# Patient Record
Sex: MALE | Race: WHITE | NOT HISPANIC OR LATINO | Employment: OTHER | URBAN - METROPOLITAN AREA
[De-identification: names, ages, dates, MRNs, and addresses within clinical notes are randomized per-mention and may not be internally consistent; named-entity substitution may affect disease eponyms.]

---

## 2018-08-06 ENCOUNTER — CONVERSION ENCOUNTER (OUTPATIENT)
Dept: SURGERY | Facility: CLINIC | Age: 58
End: 2018-08-06

## 2018-08-06 ENCOUNTER — OFFICE VISIT CONVERTED (OUTPATIENT)
Dept: SURGERY | Facility: CLINIC | Age: 58
End: 2018-08-06
Attending: SURGERY

## 2018-09-11 ENCOUNTER — OFFICE VISIT CONVERTED (OUTPATIENT)
Dept: SURGERY | Facility: CLINIC | Age: 58
End: 2018-09-11
Attending: SURGERY

## 2018-09-11 ENCOUNTER — CONVERSION ENCOUNTER (OUTPATIENT)
Dept: SURGERY | Facility: CLINIC | Age: 58
End: 2018-09-11

## 2019-01-25 ENCOUNTER — HOSPITAL ENCOUNTER (OUTPATIENT)
Dept: LAB | Facility: HOSPITAL | Age: 59
Discharge: HOME | End: 2019-01-25
Attending: INTERNAL MEDICINE

## 2019-01-25 LAB
ANION GAP SERPL CALC-SCNC: 22 MMOL/L (ref 8–19)
APPEARANCE UR: CLEAR
BASOPHILS # BLD AUTO: 0.11 10*3/UL (ref 0–0.2)
BASOPHILS NFR BLD AUTO: 1.15 % (ref 0–3)
BILIRUB UR QL: NEGATIVE
BUN SERPL-MCNC: 44 MG/DL (ref 5–25)
BUN/CREAT SERPL: 11 {RATIO} (ref 6–20)
CALCIUM SERPL-MCNC: 8.8 MG/DL (ref 8.7–10.4)
CHLORIDE SERPL-SCNC: 95 MMOL/L (ref 99–111)
COLOR UR: YELLOW
CONV CO2: 21 MMOL/L (ref 22–32)
CONV COLLECTION SOURCE (UA): ABNORMAL
CONV CREATININE URINE, RANDOM: 132.1 MG/DL (ref 10–300)
CONV UROBILINOGEN IN URINE BY AUTOMATED TEST STRIP: 0.2 {EHRLICHU}/DL (ref 0.1–1)
CREAT UR-MCNC: 4 MG/DL (ref 0.7–1.2)
EOSINOPHIL # BLD AUTO: 0.19 10*3/UL (ref 0–0.7)
EOSINOPHIL # BLD AUTO: 1.94 % (ref 0–7)
ERYTHROCYTE [DISTWIDTH] IN BLOOD BY AUTOMATED COUNT: 14.7 % (ref 11.5–14.5)
GFR SERPLBLD BASED ON 1.73 SQ M-ARVRAT: 16 ML/MIN/{1.73_M2}
GLUCOSE SERPL-MCNC: 309 MG/DL (ref 70–99)
GLUCOSE UR QL: >=1000 MG/DL
HBA1C MFR BLD: 12.2 G/DL (ref 14–18)
HCT VFR BLD AUTO: 36 % (ref 42–52)
HGB UR QL STRIP: NEGATIVE
KETONES UR QL STRIP: NEGATIVE MG/DL
LEUKOCYTE ESTERASE UR QL STRIP: NEGATIVE
LYMPHOCYTES # BLD AUTO: 2.07 10*3/UL (ref 1–5)
MCH RBC QN AUTO: 27.9 PG (ref 27–31)
MCHC RBC AUTO-ENTMCNC: 33.8 G/DL (ref 33–37)
MCV RBC AUTO: 82.7 FL (ref 80–96)
MONOCYTES # BLD AUTO: 0.53 10*3/UL (ref 0.2–1.2)
MONOCYTES NFR BLD AUTO: 5.36 % (ref 3–10)
NEUTROPHILS # BLD AUTO: 6.96 10*3/UL (ref 2–8)
NEUTROPHILS NFR BLD AUTO: 70.6 % (ref 30–85)
NITRITE UR QL STRIP: NEGATIVE
NRBC BLD AUTO-RTO: 0 % (ref 0–0.01)
OSMOLALITY SERPL CALC.SUM OF ELEC: 299 MOSM/KG (ref 273–304)
PH UR STRIP.AUTO: 6 [PH] (ref 5–8)
PHOSPHATE SERPL-MCNC: 5 MG/DL (ref 2.4–4.5)
PLATELET # BLD AUTO: 305 10*3/UL (ref 130–400)
PMV BLD AUTO: 8.7 FL (ref 7.4–10.4)
POTASSIUM SERPL-SCNC: 4.8 MMOL/L (ref 3.5–5.3)
PROT UR QL: >300 MG/DL
PROT UR-MCNC: 831.1 MG/DL
PTH-INTACT SERPL-MCNC: 108.6 PG/ML (ref 11.1–79.5)
RBC # BLD AUTO: 4.35 10*6/UL (ref 4.7–6.1)
SODIUM SERPL-SCNC: 133 MMOL/L (ref 135–147)
SP GR UR: 1.02 (ref 1–1.03)
URATE SERPL-MCNC: 8.5 MG/DL (ref 3.5–8.5)
VARIANT LYMPHS NFR BLD MANUAL: 21 % (ref 20–45)
WBC # BLD AUTO: 9.86 10*3/UL (ref 4.8–10.8)

## 2019-01-26 LAB — 25(OH)D3 SERPL-MCNC: 14 NG/ML (ref 30–100)

## 2019-01-30 ENCOUNTER — APPOINTMENT (OUTPATIENT)
Dept: PREADMISSION TESTING | Facility: HOSPITAL | Age: 59
End: 2019-01-30

## 2019-01-30 ENCOUNTER — HOSPITAL ENCOUNTER (OUTPATIENT)
Dept: GENERAL RADIOLOGY | Facility: HOSPITAL | Age: 59
Discharge: HOME | End: 2019-01-30
Admitting: SURGERY

## 2019-01-30 VITALS
WEIGHT: 315 LBS | OXYGEN SATURATION: 97 % | SYSTOLIC BLOOD PRESSURE: 100 MMHG | BODY MASS INDEX: 42.66 KG/M2 | HEART RATE: 71 BPM | DIASTOLIC BLOOD PRESSURE: 65 MMHG | TEMPERATURE: 97.7 F | HEIGHT: 72 IN | RESPIRATION RATE: 18 BRPM

## 2019-01-30 LAB
ALBUMIN SERPL-MCNC: 3.5 G/DL (ref 3.5–5.2)
ALBUMIN/GLOB SERPL: 0.9 G/DL
ALP SERPL-CCNC: 96 U/L (ref 39–117)
ALT SERPL W P-5'-P-CCNC: 15 U/L (ref 1–41)
ANION GAP SERPL CALCULATED.3IONS-SCNC: 17.7 MMOL/L
APTT PPP: 27.4 SECONDS (ref 22.7–35.4)
AST SERPL-CCNC: 9 U/L (ref 1–40)
BACTERIA UR QL AUTO: NORMAL /HPF
BILIRUB SERPL-MCNC: 0.2 MG/DL (ref 0.1–1.2)
BILIRUB UR QL STRIP: NEGATIVE
BUN BLD-MCNC: 41 MG/DL (ref 6–20)
BUN/CREAT SERPL: 11.1 (ref 7–25)
CALCIUM SPEC-SCNC: 9.1 MG/DL (ref 8.6–10.5)
CHLORIDE SERPL-SCNC: 96 MMOL/L (ref 98–107)
CLARITY UR: CLEAR
CO2 SERPL-SCNC: 21.3 MMOL/L (ref 22–29)
COLOR UR: YELLOW
CREAT BLD-MCNC: 3.69 MG/DL (ref 0.76–1.27)
DEPRECATED RDW RBC AUTO: 48.5 FL (ref 37–54)
ERYTHROCYTE [DISTWIDTH] IN BLOOD BY AUTOMATED COUNT: 15.3 % (ref 11.5–14.5)
GFR SERPL CREATININE-BSD FRML MDRD: 17 ML/MIN/1.73
GLOBULIN UR ELPH-MCNC: 3.8 GM/DL
GLUCOSE BLD-MCNC: 299 MG/DL (ref 65–99)
GLUCOSE UR STRIP-MCNC: ABNORMAL MG/DL
HCT VFR BLD AUTO: 40.6 % (ref 40.4–52.2)
HGB BLD-MCNC: 12.7 G/DL (ref 13.7–17.6)
HGB UR QL STRIP.AUTO: NEGATIVE
HYALINE CASTS UR QL AUTO: NORMAL /LPF
INR PPP: 0.98 (ref 0.9–1.1)
KETONES UR QL STRIP: NEGATIVE
LEUKOCYTE ESTERASE UR QL STRIP.AUTO: NEGATIVE
MCH RBC QN AUTO: 27 PG (ref 27–32.7)
MCHC RBC AUTO-ENTMCNC: 31.3 G/DL (ref 32.6–36.4)
MCV RBC AUTO: 86.2 FL (ref 79.8–96.2)
NITRITE UR QL STRIP: NEGATIVE
PH UR STRIP.AUTO: 5.5 [PH] (ref 5–8)
PLATELET # BLD AUTO: 301 10*3/MM3 (ref 140–500)
PMV BLD AUTO: 10.6 FL (ref 6–12)
POTASSIUM BLD-SCNC: 4.3 MMOL/L (ref 3.5–5.2)
PROT SERPL-MCNC: 7.3 G/DL (ref 6–8.5)
PROT UR QL STRIP: ABNORMAL
PROTHROMBIN TIME: 12.7 SECONDS (ref 11.7–14.2)
RBC # BLD AUTO: 4.71 10*6/MM3 (ref 4.6–6)
RBC # UR: NORMAL /HPF
REF LAB TEST METHOD: NORMAL
SODIUM BLD-SCNC: 135 MMOL/L (ref 136–145)
SP GR UR STRIP: 1.02 (ref 1–1.03)
SQUAMOUS #/AREA URNS HPF: NORMAL /HPF
UROBILINOGEN UR QL STRIP: ABNORMAL
WBC NRBC COR # BLD: 9.41 10*3/MM3 (ref 4.5–10.7)
WBC UR QL AUTO: NORMAL /HPF

## 2019-01-30 PROCEDURE — 81001 URINALYSIS AUTO W/SCOPE: CPT | Performed by: SURGERY

## 2019-01-30 PROCEDURE — 93005 ELECTROCARDIOGRAM TRACING: CPT

## 2019-01-30 PROCEDURE — 85730 THROMBOPLASTIN TIME PARTIAL: CPT | Performed by: SURGERY

## 2019-01-30 PROCEDURE — 36415 COLL VENOUS BLD VENIPUNCTURE: CPT

## 2019-01-30 PROCEDURE — 85610 PROTHROMBIN TIME: CPT | Performed by: SURGERY

## 2019-01-30 PROCEDURE — 85027 COMPLETE CBC AUTOMATED: CPT | Performed by: SURGERY

## 2019-01-30 PROCEDURE — 80053 COMPREHEN METABOLIC PANEL: CPT | Performed by: SURGERY

## 2019-01-30 PROCEDURE — 71046 X-RAY EXAM CHEST 2 VIEWS: CPT

## 2019-01-30 PROCEDURE — 93010 ELECTROCARDIOGRAM REPORT: CPT | Performed by: INTERNAL MEDICINE

## 2019-01-30 RX ORDER — CARVEDILOL 25 MG/1
12.5 TABLET ORAL 2 TIMES DAILY WITH MEALS
COMMUNITY
End: 2021-06-24

## 2019-01-30 RX ORDER — BUMETANIDE 1 MG/1
2 TABLET ORAL DAILY
COMMUNITY
End: 2021-06-24 | Stop reason: SDUPTHER

## 2019-01-30 RX ORDER — AMLODIPINE BESYLATE 5 MG/1
5 TABLET ORAL 2 TIMES DAILY
COMMUNITY
End: 2021-06-24

## 2019-01-30 RX ORDER — HYDRALAZINE HYDROCHLORIDE 50 MG/1
50 TABLET, FILM COATED ORAL 2 TIMES DAILY
Status: ON HOLD | COMMUNITY
End: 2019-02-01

## 2019-01-30 RX ORDER — SPIRONOLACTONE 25 MG/1
25 TABLET ORAL DAILY
COMMUNITY
End: 2021-06-24

## 2019-01-30 RX ORDER — METOLAZONE 2.5 MG/1
2.5 TABLET ORAL 2 TIMES DAILY
COMMUNITY
End: 2021-06-24

## 2019-02-01 ENCOUNTER — ANESTHESIA (OUTPATIENT)
Dept: PERIOP | Facility: HOSPITAL | Age: 59
End: 2019-02-01

## 2019-02-01 ENCOUNTER — ANESTHESIA EVENT (OUTPATIENT)
Dept: PERIOP | Facility: HOSPITAL | Age: 59
End: 2019-02-01

## 2019-02-01 ENCOUNTER — HOSPITAL ENCOUNTER (OUTPATIENT)
Facility: HOSPITAL | Age: 59
Setting detail: HOSPITAL OUTPATIENT SURGERY
Discharge: HOME | End: 2019-02-01
Attending: SURGERY | Admitting: SURGERY

## 2019-02-01 VITALS
BODY MASS INDEX: 42.66 KG/M2 | OXYGEN SATURATION: 98 % | SYSTOLIC BLOOD PRESSURE: 157 MMHG | RESPIRATION RATE: 16 BRPM | HEIGHT: 72 IN | HEART RATE: 65 BPM | WEIGHT: 315 LBS | DIASTOLIC BLOOD PRESSURE: 80 MMHG | TEMPERATURE: 97.7 F

## 2019-02-01 PROBLEM — N18.4 CKD (CHRONIC KIDNEY DISEASE) STAGE 4, GFR 15-29 ML/MIN (HCC): Chronic | Status: ACTIVE | Noted: 2019-02-01

## 2019-02-01 LAB — GLUCOSE BLDC GLUCOMTR-MCNC: 291 MG/DL (ref 70–130)

## 2019-02-01 PROCEDURE — 25010000002 PROPOFOL 10 MG/ML EMULSION: Performed by: ANESTHESIOLOGY

## 2019-02-01 PROCEDURE — 25010000002 MIDAZOLAM PER 1 MG: Performed by: ANESTHESIOLOGY

## 2019-02-01 PROCEDURE — 82962 GLUCOSE BLOOD TEST: CPT

## 2019-02-01 PROCEDURE — 25010000003 CEFAZOLIN PER 500 MG: Performed by: SURGERY

## 2019-02-01 PROCEDURE — 25010000002 PROPOFOL 1000 MG/ML EMULSION: Performed by: ANESTHESIOLOGY

## 2019-02-01 PROCEDURE — 25010000002 HEPARIN (PORCINE) PER 1000 UNITS: Performed by: SURGERY

## 2019-02-01 PROCEDURE — 25010000002 PHENYLEPHRINE PER 1 ML: Performed by: ANESTHESIOLOGY

## 2019-02-01 PROCEDURE — 25010000002 FENTANYL CITRATE (PF) 100 MCG/2ML SOLUTION: Performed by: ANESTHESIOLOGY

## 2019-02-01 PROCEDURE — 25010000002 HEPARIN (PORCINE) PER 1000 UNITS: Performed by: ANESTHESIOLOGY

## 2019-02-01 RX ORDER — EPHEDRINE SULFATE 50 MG/ML
5 INJECTION, SOLUTION INTRAVENOUS ONCE AS NEEDED
Status: DISCONTINUED | OUTPATIENT
Start: 2019-02-01 | End: 2019-02-01 | Stop reason: HOSPADM

## 2019-02-01 RX ORDER — FLUMAZENIL 0.1 MG/ML
0.2 INJECTION INTRAVENOUS AS NEEDED
Status: DISCONTINUED | OUTPATIENT
Start: 2019-02-01 | End: 2019-02-01 | Stop reason: HOSPADM

## 2019-02-01 RX ORDER — PROMETHAZINE HYDROCHLORIDE 25 MG/ML
12.5 INJECTION, SOLUTION INTRAMUSCULAR; INTRAVENOUS ONCE AS NEEDED
Status: DISCONTINUED | OUTPATIENT
Start: 2019-02-01 | End: 2019-02-01 | Stop reason: HOSPADM

## 2019-02-01 RX ORDER — SODIUM CHLORIDE, SODIUM LACTATE, POTASSIUM CHLORIDE, CALCIUM CHLORIDE 600; 310; 30; 20 MG/100ML; MG/100ML; MG/100ML; MG/100ML
9 INJECTION, SOLUTION INTRAVENOUS CONTINUOUS
Status: DISCONTINUED | OUTPATIENT
Start: 2019-02-01 | End: 2019-02-01 | Stop reason: HOSPADM

## 2019-02-01 RX ORDER — HEPARIN SODIUM 1000 [USP'U]/ML
INJECTION, SOLUTION INTRAVENOUS; SUBCUTANEOUS AS NEEDED
Status: DISCONTINUED | OUTPATIENT
Start: 2019-02-01 | End: 2019-02-01 | Stop reason: SURG

## 2019-02-01 RX ORDER — HYDROMORPHONE HYDROCHLORIDE 1 MG/ML
0.5 INJECTION, SOLUTION INTRAMUSCULAR; INTRAVENOUS; SUBCUTANEOUS
Status: DISCONTINUED | OUTPATIENT
Start: 2019-02-01 | End: 2019-02-01 | Stop reason: HOSPADM

## 2019-02-01 RX ORDER — DIPHENHYDRAMINE HCL 25 MG
25 CAPSULE ORAL
Status: DISCONTINUED | OUTPATIENT
Start: 2019-02-01 | End: 2019-02-01 | Stop reason: HOSPADM

## 2019-02-01 RX ORDER — CEFAZOLIN SODIUM IN 0.9 % NACL 3 G/100 ML
3 INTRAVENOUS SOLUTION, PIGGYBACK (ML) INTRAVENOUS ONCE
Status: COMPLETED | OUTPATIENT
Start: 2019-02-01 | End: 2019-02-01

## 2019-02-01 RX ORDER — ONDANSETRON 2 MG/ML
4 INJECTION INTRAMUSCULAR; INTRAVENOUS ONCE AS NEEDED
Status: DISCONTINUED | OUTPATIENT
Start: 2019-02-01 | End: 2019-02-01 | Stop reason: HOSPADM

## 2019-02-01 RX ORDER — NALOXONE HCL 0.4 MG/ML
0.2 VIAL (ML) INJECTION AS NEEDED
Status: DISCONTINUED | OUTPATIENT
Start: 2019-02-01 | End: 2019-02-01 | Stop reason: HOSPADM

## 2019-02-01 RX ORDER — FENTANYL CITRATE 50 UG/ML
50 INJECTION, SOLUTION INTRAMUSCULAR; INTRAVENOUS
Status: DISCONTINUED | OUTPATIENT
Start: 2019-02-01 | End: 2019-02-01 | Stop reason: HOSPADM

## 2019-02-01 RX ORDER — FAMOTIDINE 10 MG/ML
20 INJECTION, SOLUTION INTRAVENOUS ONCE
Status: COMPLETED | OUTPATIENT
Start: 2019-02-01 | End: 2019-02-01

## 2019-02-01 RX ORDER — PROPOFOL 10 MG/ML
VIAL (ML) INTRAVENOUS AS NEEDED
Status: DISCONTINUED | OUTPATIENT
Start: 2019-02-01 | End: 2019-02-01 | Stop reason: SURG

## 2019-02-01 RX ORDER — LIDOCAINE HYDROCHLORIDE 10 MG/ML
0.5 INJECTION, SOLUTION EPIDURAL; INFILTRATION; INTRACAUDAL; PERINEURAL ONCE AS NEEDED
Status: DISCONTINUED | OUTPATIENT
Start: 2019-02-01 | End: 2019-02-01 | Stop reason: HOSPADM

## 2019-02-01 RX ORDER — MIDAZOLAM HYDROCHLORIDE 1 MG/ML
1 INJECTION INTRAMUSCULAR; INTRAVENOUS
Status: DISCONTINUED | OUTPATIENT
Start: 2019-02-01 | End: 2019-02-01 | Stop reason: HOSPADM

## 2019-02-01 RX ORDER — MIDAZOLAM HYDROCHLORIDE 1 MG/ML
2 INJECTION INTRAMUSCULAR; INTRAVENOUS
Status: DISCONTINUED | OUTPATIENT
Start: 2019-02-01 | End: 2019-02-01 | Stop reason: HOSPADM

## 2019-02-01 RX ORDER — SODIUM CHLORIDE 0.9 % (FLUSH) 0.9 %
1-10 SYRINGE (ML) INJECTION AS NEEDED
Status: DISCONTINUED | OUTPATIENT
Start: 2019-02-01 | End: 2019-02-01 | Stop reason: HOSPADM

## 2019-02-01 RX ORDER — PROMETHAZINE HYDROCHLORIDE 25 MG/1
25 SUPPOSITORY RECTAL ONCE AS NEEDED
Status: DISCONTINUED | OUTPATIENT
Start: 2019-02-01 | End: 2019-02-01 | Stop reason: HOSPADM

## 2019-02-01 RX ORDER — PROMETHAZINE HYDROCHLORIDE 25 MG/1
25 TABLET ORAL ONCE AS NEEDED
Status: DISCONTINUED | OUTPATIENT
Start: 2019-02-01 | End: 2019-02-01 | Stop reason: HOSPADM

## 2019-02-01 RX ADMIN — PROPOFOL 75 MCG/KG/MIN: 10 INJECTION, EMULSION INTRAVENOUS at 08:44

## 2019-02-01 RX ADMIN — Medication 1 MG: at 09:11

## 2019-02-01 RX ADMIN — PHENYLEPHRINE HYDROCHLORIDE 50 MCG: 10 INJECTION INTRAVENOUS at 09:09

## 2019-02-01 RX ADMIN — FAMOTIDINE 20 MG: 10 INJECTION INTRAVENOUS at 08:27

## 2019-02-01 RX ADMIN — HEPARIN SODIUM 3000 UNITS: 1000 INJECTION, SOLUTION INTRAVENOUS; SUBCUTANEOUS at 09:28

## 2019-02-01 RX ADMIN — SODIUM CHLORIDE, POTASSIUM CHLORIDE, SODIUM LACTATE AND CALCIUM CHLORIDE: 600; 310; 30; 20 INJECTION, SOLUTION INTRAVENOUS at 08:32

## 2019-02-01 RX ADMIN — FENTANYL CITRATE 50 MCG: 50 INJECTION INTRAMUSCULAR; INTRAVENOUS at 10:03

## 2019-02-01 RX ADMIN — FENTANYL CITRATE 50 MCG: 50 INJECTION INTRAMUSCULAR; INTRAVENOUS at 08:58

## 2019-02-01 RX ADMIN — PROPOFOL 80 MG: 10 INJECTION, EMULSION INTRAVENOUS at 08:44

## 2019-02-01 RX ADMIN — Medication 2 MG: at 08:27

## 2019-02-01 RX ADMIN — EPHEDRINE SULFATE 7.5 MG: 50 INJECTION INTRAMUSCULAR; INTRAVENOUS; SUBCUTANEOUS at 09:30

## 2019-02-01 RX ADMIN — CEFAZOLIN 3 G: 1 INJECTION, POWDER, FOR SOLUTION INTRAMUSCULAR; INTRAVENOUS; PARENTERAL at 08:49

## 2019-02-01 RX ADMIN — Medication 1 MG: at 08:58

## 2019-02-01 RX ADMIN — SODIUM CHLORIDE, POTASSIUM CHLORIDE, SODIUM LACTATE AND CALCIUM CHLORIDE 9 ML/HR: 600; 310; 30; 20 INJECTION, SOLUTION INTRAVENOUS at 08:27

## 2019-02-01 RX ADMIN — PHENYLEPHRINE HYDROCHLORIDE 100 MCG: 10 INJECTION INTRAVENOUS at 09:16

## 2019-02-01 NOTE — OP NOTE
Operative Note    Pre-op Diagnosis: Chronic Kidney Disease Stage IV    Post-op Diagnosis: Same    Procedure(s):  left arm fistula formation    Surgeon(s):  Daniel Solis MD    Assistant: Conchita Rodriguez and they provided critical assistance during the case including suctioning, exposure, retraction, and reduction of blood loss.    Anesthesia: Monitor Anesthesia Care    Estimated Blood Loss: minimal     Staff:   Circulator: Helen Higgins RN; Veronica Vera RN  Scrub Person: Francesca Bland RN  Assistant: Conchita Rodriguez    Complications: None    Specimen: None    Findings:   The vein was of good quality    The artery was of good quality    The thrill was of good quality      The patient's artery was a bit deep.  I used for the deep branches to the forearm and spatulated at a hooded anastomosis to the artery.  The fistula thrill felt great at the anastomosis but was harder to fill up the arm.  I thought this could be because of a branch but explored and really didn't find one.  Could also be due to body habitus.    Indications:  The patient is an 58 y.o. male referred for evaluation for AV fistula placement.  The patient has Chronic Kidney Disease Stage IV .  After evaluation in the office and ultrasound vein mapping of the upper extremities the patient was determined to be a candidate for brachiocephalic arteriovenous fistula.  The risks, benefits, and alternatives were discussed with the patient who agreed to proceed.  This includes but is not limited to nerve injury, vascular compromise, infection, and failure to mature.    Procedure:  The patient was taken to Operating Room and identified as Min Trejo and the procedure verified as left brachial cephalic AVF. A Time Out was held and the above information confirmed.    In the operating room with the patient sedated the arm was mapped under ultrasound again to help determine the best location for incision.  A decision was made to make the  incision at the antecubital fossa.  Skin and subcutaneous tissues were anesthetized.  The skin and subcutaneous was divided sharply.  The cephalic vein was identified and dissected out circumferentially for a significant segment.  It was marked.  The bicipital aponeurosis was exposed.  Local anesthesia was administered below this and then the aponeurosis was divided sharply.  The brachial artery was exposed circumferentially.  Enough of the cephalic vein was exposed so that it could be ligated distally with a silk ligature and then transposed over to the brachial artery.  The vein is spatulated in order to fit the arterial anastomosis.  It was marked to reduce the risk of twisting.  The brachial artery was opened up with 11 blade scalpel and small Johansen scissors.  A running anastomosis is done with a 6-0 Prolene suture.  It was flushed and de-aired prior to completion.  There was a good thrill to completion of the case.  There was a good Doppler signal at the wrist as well.  The wound was copious irrigated with an antibiotic irrigation and closed in 2 layers with Vicryl sutures.  Dermabond glue was used for the skin.  Patient tolerated it well and no intraoperative complications were immediately apparent.        There are no hospital problems to display for this patient.     Daniel Solis MD     Date: 2/1/2019  Time: 10:10 AM

## 2019-02-01 NOTE — DISCHARGE INSTRUCTIONS
[facility]  Italo Keith, Gunjan Villanueva Rachel, Scherrer Thomas  [address], [address]  [phone]    Post-Operative Instructions for AV Fistula / Graft   Diet: Regular Diet    Medications: Take your regularly scheduled medications on the day of your surgery, unless your doctor has directed you otherwise. You may be sent home with a prescription for pain medication, follow the directions as prescribed.    Activity Restrictions / Driving: Avoid lifting more than 15 pounds or other activities that stress or compress the access area. No driving for the remainder of the day after surgery. You may drive when you no longer are taking narcotic pain medications. If a nerve block was done to numb your arm for surgery, you will be placed in an arm sling.  This numbness and inability to move the arm can last for as little as 6 hours but as many as 18.  The sling should be used during this time but can be removed when sensation and movement of your arm is normal and does not need to be used after that. Use of the arm is encouraged after the surgery.    Incision Care: Some bruising is normal. If you have drainage from the incision please notify the office. Dressing should be removed in 48 hours. After dressing is removed, it is OK to shower. Do not submerge incision until cleared by your surgeon (bath or swimming).    Bathing and Showering: You may shower after you remove your dressing.    Follow-up Appointments: You will need to return to the office for a follow-up visit within 1-3 weeks after your surgery. Please make sure you have your appointment scheduled, call [phone].    The patient (you) should:  1. Avoid wearing tight constrictive clothing over that arm.  2. Avoid wearing jewelry that is tight, such as a watch on the access arm.  3. Avoid carrying heavy objects.  4. Avoid purse straps over the fistula.  5. Avoid sleeping on the arm or keeping it bent for extended periods of time.  6.  "Each day, using your opposite hand, feel over the fistula for the \"thrill\" or vibration that is normally present.    Fistula Information / Care:  ·  It is normal to have swelling in the surgical area. To help control this swelling, you should elevate your arm on a pillow.  ·  Wiggle your fingers and clinch your fist 10 times every hour, while awake, for the first 5-7 days. Also, bend and straighten at the elbow to regain normal range of motion. These exercises are designed to promote circulation in the fingers and aid in draining away the excess fluid accumulation in the immediate area.  · No blood pressures or needle sticks in the arm with your access.    Call the office for the followin. Fever greater than 101.0  2. Uncontrolled pain. This is on a scale of 1-10 (10 being the worst pain imaginable) your pain is a level 7 or above.  3. It is important that you notify our office if you are having numbness and significant pain in the extremity in which you have just had surgery!  4. Decreased or absent thrill.  5. Nausea, diarrhea, and/or vomiting that continue for 12-24 hours.  6. Signs of an infection: redness, increased swelling, drainage, fever and/or chills.  7. Chest pain or difficulty breathing.    The fistula or graft CAN NOT be used until the MD has given written approval. Generally, a graft will be ready to use in 2 weeks, and a fistula will be ready to use in 6-8 weeks.     If you have further questions after reading this handout, the office is open from 8:30am to 5:00pm Monday through Friday. Call [phone].  "

## 2019-02-01 NOTE — ANESTHESIA PREPROCEDURE EVALUATION
Anesthesia Evaluation     Patient summary reviewed and Nursing notes reviewed   NPO Solid Status: > 8 hours  NPO Liquid Status: > 2 hours           Airway   Mallampati: II  TM distance: >3 FB  Neck ROM: full  Dental - normal exam     Pulmonary - normal exam   (+) a smoker Current Smoked day of surgery,   Cardiovascular - normal exam    ECG reviewed    (+) hypertension 2 medications or greater, past MI  >12 months, CAD, CABG,       Neuro/Psych- negative ROS  GI/Hepatic/Renal/Endo    (+) morbid obesity, GERD,  renal disease CRI, diabetes mellitus type 2 poorly controlled,     Musculoskeletal (-) negative ROS    Abdominal    Substance History - negative use     OB/GYN negative ob/gyn ROS         Other                        Anesthesia Plan    ASA 3     MAC     Anesthetic plan, all risks, benefits, and alternatives have been provided, discussed and informed consent has been obtained with: patient.

## 2019-04-05 ENCOUNTER — HOSPITAL ENCOUNTER (OUTPATIENT)
Dept: LAB | Facility: HOSPITAL | Age: 59
Discharge: HOME | End: 2019-04-05
Attending: INTERNAL MEDICINE

## 2019-04-05 LAB
25(OH)D3 SERPL-MCNC: 18 NG/ML (ref 30–100)
ANION GAP SERPL CALC-SCNC: 20 MMOL/L (ref 8–19)
APPEARANCE UR: CLEAR
BASOPHILS # BLD AUTO: 0.17 10*3/UL (ref 0–0.2)
BASOPHILS NFR BLD AUTO: 2 % (ref 0–3)
BILIRUB UR QL: NEGATIVE
BUN SERPL-MCNC: 59 MG/DL (ref 5–25)
BUN/CREAT SERPL: 13 {RATIO} (ref 6–20)
CALCIUM SERPL-MCNC: 9.1 MG/DL (ref 8.7–10.4)
CHLORIDE SERPL-SCNC: 99 MMOL/L (ref 99–111)
COLOR UR: YELLOW
CONV ABS IMM GRAN: 0.02 10*3/UL (ref 0–0.2)
CONV CO2: 23 MMOL/L (ref 22–32)
CONV COLLECTION SOURCE (UA): ABNORMAL
CONV CREATININE URINE, RANDOM: 107.9 MG/DL (ref 10–300)
CONV IMMATURE GRAN: 0.2 % (ref 0–1.8)
CONV UROBILINOGEN IN URINE BY AUTOMATED TEST STRIP: 0.2 {EHRLICHU}/DL (ref 0.1–1)
CREAT UR-MCNC: 4.71 MG/DL (ref 0.7–1.2)
DEPRECATED RDW RBC AUTO: 45.6 FL (ref 35.1–43.9)
EOSINOPHIL # BLD AUTO: 0.2 10*3/UL (ref 0–0.7)
EOSINOPHIL # BLD AUTO: 2.4 % (ref 0–7)
ERYTHROCYTE [DISTWIDTH] IN BLOOD BY AUTOMATED COUNT: 14.2 % (ref 11.6–14.4)
GFR SERPLBLD BASED ON 1.73 SQ M-ARVRAT: 13 ML/MIN/{1.73_M2}
GLUCOSE SERPL-MCNC: 77 MG/DL (ref 70–99)
GLUCOSE UR QL: 250 MG/DL
HBA1C MFR BLD: 12.2 G/DL (ref 14–18)
HCT VFR BLD AUTO: 40.3 % (ref 42–52)
HGB UR QL STRIP: NEGATIVE
KETONES UR QL STRIP: NEGATIVE MG/DL
LEUKOCYTE ESTERASE UR QL STRIP: NEGATIVE
LYMPHOCYTES # BLD AUTO: 1.76 10*3/UL (ref 1–5)
MCH RBC QN AUTO: 26.5 PG (ref 27–31)
MCHC RBC AUTO-ENTMCNC: 30.3 G/DL (ref 33–37)
MCV RBC AUTO: 87.6 FL (ref 80–96)
MONOCYTES # BLD AUTO: 0.77 10*3/UL (ref 0.2–1.2)
MONOCYTES NFR BLD AUTO: 9.2 % (ref 3–10)
NEUTROPHILS # BLD AUTO: 5.44 10*3/UL (ref 2–8)
NEUTROPHILS NFR BLD AUTO: 65.1 % (ref 30–85)
NITRITE UR QL STRIP: NEGATIVE
NRBC CBCN: 0 % (ref 0–0.7)
OSMOLALITY SERPL CALC.SUM OF ELEC: 299 MOSM/KG (ref 273–304)
PH UR STRIP.AUTO: 5 [PH] (ref 5–8)
PHOSPHATE SERPL-MCNC: 5.2 MG/DL (ref 2.4–4.5)
PLATELET # BLD AUTO: 303 10*3/UL (ref 130–400)
PMV BLD AUTO: 11.3 FL (ref 9.4–12.4)
POTASSIUM SERPL-SCNC: 5 MMOL/L (ref 3.5–5.3)
PROT UR QL: >300 MG/DL
PROT UR-MCNC: 663.9 MG/DL
PTH-INTACT SERPL-MCNC: 144.9 PG/ML (ref 11.1–79.5)
RBC # BLD AUTO: 4.6 10*6/UL (ref 4.7–6.1)
SODIUM SERPL-SCNC: 137 MMOL/L (ref 135–147)
SP GR UR: 1.02 (ref 1–1.03)
VARIANT LYMPHS NFR BLD MANUAL: 21.1 % (ref 20–45)
WBC # BLD AUTO: 8.36 10*3/UL (ref 4.8–10.8)

## 2019-06-04 ENCOUNTER — HOSPITAL ENCOUNTER (OUTPATIENT)
Dept: LAB | Facility: HOSPITAL | Age: 59
Discharge: HOME | End: 2019-06-04
Attending: INTERNAL MEDICINE

## 2019-06-04 LAB
25(OH)D3 SERPL-MCNC: 12.8 NG/ML (ref 30–100)
ANION GAP SERPL CALC-SCNC: 18 MMOL/L (ref 8–19)
APPEARANCE UR: CLEAR
BASOPHILS # BLD AUTO: 0.11 10*3/UL (ref 0–0.2)
BASOPHILS NFR BLD AUTO: 1.6 % (ref 0–3)
BILIRUB UR QL: NEGATIVE
BUN SERPL-MCNC: 39 MG/DL (ref 5–25)
BUN/CREAT SERPL: 8 {RATIO} (ref 6–20)
CALCIUM SERPL-MCNC: 8.6 MG/DL (ref 8.7–10.4)
CHLORIDE SERPL-SCNC: 102 MMOL/L (ref 99–111)
COLOR UR: YELLOW
CONV ABS IMM GRAN: 0.02 10*3/UL (ref 0–0.2)
CONV BACTERIA: NEGATIVE
CONV CO2: 21 MMOL/L (ref 22–32)
CONV COLLECTION SOURCE (UA): ABNORMAL
CONV CREATININE URINE, RANDOM: 77.3 MG/DL (ref 10–300)
CONV IMMATURE GRAN: 0.3 % (ref 0–1.8)
CONV UROBILINOGEN IN URINE BY AUTOMATED TEST STRIP: 0.2 {EHRLICHU}/DL (ref 0.1–1)
CREAT UR-MCNC: 4.71 MG/DL (ref 0.7–1.2)
DEPRECATED RDW RBC AUTO: 47.7 FL (ref 35.1–43.9)
EOSINOPHIL # BLD AUTO: 0.18 10*3/UL (ref 0–0.7)
EOSINOPHIL # BLD AUTO: 2.6 % (ref 0–7)
ERYTHROCYTE [DISTWIDTH] IN BLOOD BY AUTOMATED COUNT: 14.7 % (ref 11.6–14.4)
GFR SERPLBLD BASED ON 1.73 SQ M-ARVRAT: 13 ML/MIN/{1.73_M2}
GLUCOSE SERPL-MCNC: 132 MG/DL (ref 70–99)
GLUCOSE UR QL: 500 MG/DL
HBA1C MFR BLD: 11.8 G/DL (ref 14–18)
HCT VFR BLD AUTO: 38.7 % (ref 42–52)
HGB UR QL STRIP: ABNORMAL
KETONES UR QL STRIP: NEGATIVE MG/DL
LEUKOCYTE ESTERASE UR QL STRIP: NEGATIVE
LYMPHOCYTES # BLD AUTO: 1.42 10*3/UL (ref 1–5)
MCH RBC QN AUTO: 27 PG (ref 27–31)
MCHC RBC AUTO-ENTMCNC: 30.5 G/DL (ref 33–37)
MCV RBC AUTO: 88.6 FL (ref 80–96)
MONOCYTES # BLD AUTO: 0.42 10*3/UL (ref 0.2–1.2)
MONOCYTES NFR BLD AUTO: 6.2 % (ref 3–10)
NEUTROPHILS # BLD AUTO: 4.65 10*3/UL (ref 2–8)
NEUTROPHILS NFR BLD AUTO: 68.4 % (ref 30–85)
NITRITE UR QL STRIP: NEGATIVE
NRBC CBCN: 0 % (ref 0–0.7)
OSMOLALITY SERPL CALC.SUM OF ELEC: 295 MOSM/KG (ref 273–304)
PH UR STRIP.AUTO: 5.5 [PH] (ref 5–8)
PHOSPHATE SERPL-MCNC: 4.5 MG/DL (ref 2.4–4.5)
PLATELET # BLD AUTO: 283 10*3/UL (ref 130–400)
PMV BLD AUTO: 10.9 FL (ref 9.4–12.4)
POTASSIUM SERPL-SCNC: 4.1 MMOL/L (ref 3.5–5.3)
PROT UR QL: 300 MG/DL
PROT UR-MCNC: 513.2 MG/DL
PTH-INTACT SERPL-MCNC: 121.9 PG/ML (ref 11.1–79.5)
RBC # BLD AUTO: 4.37 10*6/UL (ref 4.7–6.1)
RBC #/AREA URNS HPF: ABNORMAL /[HPF]
SODIUM SERPL-SCNC: 137 MMOL/L (ref 135–147)
SP GR UR: 1.01 (ref 1–1.03)
URATE SERPL-MCNC: 7.6 MG/DL (ref 3.5–8.5)
VARIANT LYMPHS NFR BLD MANUAL: 20.9 % (ref 20–45)
WBC # BLD AUTO: 6.8 10*3/UL (ref 4.8–10.8)
WBC #/AREA URNS HPF: ABNORMAL /[HPF]

## 2019-08-12 ENCOUNTER — HOSPITAL ENCOUNTER (OUTPATIENT)
Dept: LAB | Facility: HOSPITAL | Age: 59
Discharge: HOME | End: 2019-08-12
Attending: INTERNAL MEDICINE

## 2019-08-12 LAB
25(OH)D3 SERPL-MCNC: 13.7 NG/ML (ref 30–100)
ANION GAP SERPL CALC-SCNC: 16 MMOL/L (ref 8–19)
APPEARANCE UR: CLEAR
BASOPHILS # BLD AUTO: 0.19 10*3/UL (ref 0–0.2)
BASOPHILS NFR BLD AUTO: 2.1 % (ref 0–3)
BILIRUB UR QL: NEGATIVE
BUN SERPL-MCNC: 48 MG/DL (ref 5–25)
BUN/CREAT SERPL: 11 {RATIO} (ref 6–20)
CALCIUM SERPL-MCNC: 9.1 MG/DL (ref 8.7–10.4)
CHLORIDE SERPL-SCNC: 103 MMOL/L (ref 99–111)
COLOR UR: YELLOW
CONV ABS IMM GRAN: 0.02 10*3/UL (ref 0–0.2)
CONV BACTERIA: NEGATIVE
CONV CO2: 21 MMOL/L (ref 22–32)
CONV COLLECTION SOURCE (UA): ABNORMAL
CONV CREATININE URINE, RANDOM: 124.5 MG/DL (ref 10–300)
CONV HYALINE CASTS IN URINE MICRO: ABNORMAL /[LPF]
CONV IMMATURE GRAN: 0.2 % (ref 0–1.8)
CONV UROBILINOGEN IN URINE BY AUTOMATED TEST STRIP: 0.2 {EHRLICHU}/DL (ref 0.1–1)
CREAT UR-MCNC: 4.55 MG/DL (ref 0.7–1.2)
DEPRECATED RDW RBC AUTO: 44.4 FL (ref 35.1–43.9)
EOSINOPHIL # BLD AUTO: 0.19 10*3/UL (ref 0–0.7)
EOSINOPHIL # BLD AUTO: 2.1 % (ref 0–7)
ERYTHROCYTE [DISTWIDTH] IN BLOOD BY AUTOMATED COUNT: 13.9 % (ref 11.6–14.4)
GFR SERPLBLD BASED ON 1.73 SQ M-ARVRAT: 13 ML/MIN/{1.73_M2}
GLUCOSE SERPL-MCNC: 204 MG/DL (ref 70–99)
GLUCOSE UR QL: 500 MG/DL
HCT VFR BLD AUTO: 39.7 % (ref 42–52)
HGB BLD-MCNC: 12.2 G/DL (ref 14–18)
HGB UR QL STRIP: ABNORMAL
KETONES UR QL STRIP: NEGATIVE MG/DL
LEUKOCYTE ESTERASE UR QL STRIP: NEGATIVE
LYMPHOCYTES # BLD AUTO: 1.67 10*3/UL (ref 1–5)
LYMPHOCYTES NFR BLD AUTO: 18.6 % (ref 20–45)
MCH RBC QN AUTO: 26.8 PG (ref 27–31)
MCHC RBC AUTO-ENTMCNC: 30.7 G/DL (ref 33–37)
MCV RBC AUTO: 87.3 FL (ref 80–96)
MONOCYTES # BLD AUTO: 0.6 10*3/UL (ref 0.2–1.2)
MONOCYTES NFR BLD AUTO: 6.7 % (ref 3–10)
NEUTROPHILS # BLD AUTO: 6.31 10*3/UL (ref 2–8)
NEUTROPHILS NFR BLD AUTO: 70.3 % (ref 30–85)
NITRITE UR QL STRIP: NEGATIVE
NRBC CBCN: 0 % (ref 0–0.7)
OSMOLALITY SERPL CALC.SUM OF ELEC: 300 MOSM/KG (ref 273–304)
PH UR STRIP.AUTO: 5.5 [PH] (ref 5–8)
PHOSPHATE SERPL-MCNC: 4.4 MG/DL (ref 2.4–4.5)
PLATELET # BLD AUTO: 325 10*3/UL (ref 130–400)
PMV BLD AUTO: 11.2 FL (ref 9.4–12.4)
POTASSIUM SERPL-SCNC: 4.4 MMOL/L (ref 3.5–5.3)
PROT UR QL: >300 MG/DL
PROT UR-MCNC: 860 MG/DL
PTH-INTACT SERPL-MCNC: 74.9 PG/ML (ref 11.1–79.5)
RBC # BLD AUTO: 4.55 10*6/UL (ref 4.7–6.1)
RBC #/AREA URNS HPF: ABNORMAL /[HPF]
SODIUM SERPL-SCNC: 136 MMOL/L (ref 135–147)
SP GR UR: 1.02 (ref 1–1.03)
WBC # BLD AUTO: 8.98 10*3/UL (ref 4.8–10.8)
WBC #/AREA URNS HPF: ABNORMAL /[HPF]

## 2020-05-14 ENCOUNTER — HOSPITAL ENCOUNTER (OUTPATIENT)
Dept: CARDIOLOGY | Facility: HOSPITAL | Age: 60
Discharge: HOME | End: 2020-05-14
Attending: SURGERY

## 2020-07-30 ENCOUNTER — HOSPITAL ENCOUNTER (OUTPATIENT)
Dept: OTHER | Facility: HOSPITAL | Age: 60
Discharge: HOME | End: 2020-07-30
Attending: FAMILY MEDICINE

## 2020-07-30 LAB
ALBUMIN SERPL-MCNC: 4.3 G/DL (ref 3.5–5)
ALBUMIN/GLOB SERPL: 1.2 {RATIO} (ref 1.4–2.6)
ALP SERPL-CCNC: 107 U/L (ref 56–119)
ALT SERPL-CCNC: 18 U/L (ref 10–40)
AMMONIA PLAS-MCNC: 10 UMOL/L (ref 16–60)
ANION GAP SERPL CALC-SCNC: 34 MMOL/L (ref 8–19)
AST SERPL-CCNC: 14 U/L (ref 15–50)
BASOPHILS # BLD AUTO: 0.15 10*3/UL (ref 0–0.2)
BASOPHILS NFR BLD AUTO: 1.6 % (ref 0–3)
BILIRUB SERPL-MCNC: 0.36 MG/DL (ref 0.2–1.3)
BUN SERPL-MCNC: 52 MG/DL (ref 5–25)
BUN/CREAT SERPL: 6 {RATIO} (ref 6–20)
CALCIUM SERPL-MCNC: 10 MG/DL (ref 8.7–10.4)
CHLORIDE SERPL-SCNC: 95 MMOL/L (ref 99–111)
CONV ABS IMM GRAN: 0.04 10*3/UL (ref 0–0.2)
CONV CO2: 20 MMOL/L (ref 22–32)
CONV IMMATURE GRAN: 0.4 % (ref 0–1.8)
CONV TOTAL PROTEIN: 7.9 G/DL (ref 6.3–8.2)
CREAT UR-MCNC: 8.6 MG/DL (ref 0.7–1.2)
DEPRECATED RDW RBC AUTO: 45.3 FL (ref 35.1–43.9)
EOSINOPHIL # BLD AUTO: 0.1 10*3/UL (ref 0–0.7)
EOSINOPHIL # BLD AUTO: 1.1 % (ref 0–7)
ERYTHROCYTE [DISTWIDTH] IN BLOOD BY AUTOMATED COUNT: 14 % (ref 11.6–14.4)
ERYTHROCYTE [SEDIMENTATION RATE] IN BLOOD: 38 MM/H (ref 0–20)
GFR SERPLBLD BASED ON 1.73 SQ M-ARVRAT: 6 ML/MIN/{1.73_M2}
GLOBULIN UR ELPH-MCNC: 3.6 G/DL (ref 2–3.5)
GLUCOSE SERPL-MCNC: 140 MG/DL (ref 70–99)
HCT VFR BLD AUTO: 45.1 % (ref 42–52)
HGB BLD-MCNC: 14.6 G/DL (ref 14–18)
INR PPP: 0.94 (ref 2–3)
LYMPHOCYTES # BLD AUTO: 1.67 10*3/UL (ref 1–5)
LYMPHOCYTES NFR BLD AUTO: 17.7 % (ref 20–45)
MCH RBC QN AUTO: 28.7 PG (ref 27–31)
MCHC RBC AUTO-ENTMCNC: 32.4 G/DL (ref 33–37)
MCV RBC AUTO: 88.6 FL (ref 80–96)
MONOCYTES # BLD AUTO: 0.58 10*3/UL (ref 0.2–1.2)
MONOCYTES NFR BLD AUTO: 6.1 % (ref 3–10)
NEUTROPHILS # BLD AUTO: 6.9 10*3/UL (ref 2–8)
NEUTROPHILS NFR BLD AUTO: 73.1 % (ref 30–85)
NRBC CBCN: 0 % (ref 0–0.7)
OSMOLALITY SERPL CALC.SUM OF ELEC: 314 MOSM/KG (ref 273–304)
PLATELET # BLD AUTO: 274 10*3/UL (ref 130–400)
PMV BLD AUTO: 10.4 FL (ref 9.4–12.4)
POTASSIUM SERPL-SCNC: 4.6 MMOL/L (ref 3.5–5.3)
PROTHROMBIN TIME: 10.3 S (ref 9.4–12)
RBC # BLD AUTO: 5.09 10*6/UL (ref 4.7–6.1)
SODIUM SERPL-SCNC: 144 MMOL/L (ref 135–147)
T4 FREE SERPL-MCNC: 1.1 NG/DL (ref 0.9–1.8)
TSH SERPL-ACNC: 2.12 M[IU]/L (ref 0.27–4.2)
WBC # BLD AUTO: 9.44 10*3/UL (ref 4.8–10.8)

## 2020-09-24 ENCOUNTER — HOSPITAL ENCOUNTER (OUTPATIENT)
Dept: LAB | Facility: HOSPITAL | Age: 60
Discharge: HOME | End: 2020-09-24

## 2020-10-05 LAB
COTININE UR-MCNC: 2.5 NG/ML
NICOTINE SERPL-MCNC: <1 NG/ML

## 2020-12-18 ENCOUNTER — OFFICE VISIT CONVERTED (OUTPATIENT)
Dept: CARDIOLOGY | Facility: CLINIC | Age: 60
End: 2020-12-18
Attending: SPECIALIST

## 2020-12-18 ENCOUNTER — CONVERSION ENCOUNTER (OUTPATIENT)
Dept: CARDIOLOGY | Facility: CLINIC | Age: 60
End: 2020-12-18

## 2020-12-29 ENCOUNTER — HOSPITAL ENCOUNTER (OUTPATIENT)
Dept: NUCLEAR MEDICINE | Facility: HOSPITAL | Age: 60
Discharge: HOME | End: 2020-12-29
Attending: SPECIALIST

## 2021-01-14 ENCOUNTER — CONVERSION ENCOUNTER (OUTPATIENT)
Dept: CARDIOLOGY | Facility: CLINIC | Age: 61
End: 2021-01-14
Attending: SPECIALIST

## 2021-03-18 ENCOUNTER — CONVERSION ENCOUNTER (OUTPATIENT)
Dept: FAMILY MEDICINE CLINIC | Facility: CLINIC | Age: 61
End: 2021-03-18

## 2021-03-18 ENCOUNTER — OFFICE VISIT CONVERTED (OUTPATIENT)
Dept: FAMILY MEDICINE CLINIC | Facility: CLINIC | Age: 61
End: 2021-03-18
Attending: NURSE PRACTITIONER

## 2021-03-18 LAB
AMPHET UR QL CFM: NEGATIVE
BARBITURATES UR QL: NEGATIVE
BENZODIAZ UR QL SCN: NEGATIVE
CONV AMP/METHAMP UR: NEGATIVE
CONV COCAINE, UR: NEGATIVE
MDMA UR QL SCN: NEGATIVE
METHADONE UR QL SCN: NEGATIVE
OPIATES UR QL SCN: NEGATIVE
OXYCODONE UR QL SCN: NEGATIVE
PCP UR QL: NEGATIVE
THC SERPLBLD CFM-MCNC: NEGATIVE NG/ML

## 2021-03-22 ENCOUNTER — HOSPITAL ENCOUNTER (OUTPATIENT)
Dept: LAB | Facility: HOSPITAL | Age: 61
Discharge: HOME | End: 2021-03-22
Attending: NURSE PRACTITIONER

## 2021-03-22 LAB
EST. AVERAGE GLUCOSE BLD GHB EST-MCNC: 160 MG/DL
HBA1C MFR BLD: 7.2 % (ref 3.5–5.7)

## 2021-05-10 NOTE — PROCEDURES
"   Procedure Note      Patient Name: Min Trejo   Patient ID: [id number]   Sex: Male   YOB: 1960    Primary Care Provider: Tavon Null MD   Referring Provider: Shanel Sahu MD    Visit Date: January 14, 2021    Provider: Jaime Cui MD   Location: [facility]   Location Address: [address] [place] [address], [address]   [place]  [id number]   Location Phone: [phone]          FINAL REPORT   TRANSTHORACIC ECHOCARDIOGRAM REPORT    Diagnosis: Cardiomyopathy   Height: 6'0\" Weight: 294 B/P: 154/70 BSA: 2.51   Tech: [facility]   MEASUREMENTS:  RVID (Diastole) : RVID. (NORMAL: 0.7 to 2.4 cm max)   LVID (Systole): 3.5 cm (Diastole): 5.4 cm . (NORMAL: 3.7 - 5.4 cm)   Posterior Wall Thickness (Diastole): 1.2 cm. (NORMAL: 0.8 - 1.1 cm)   Septal Thickness (Diastole): 1.3 cm. (NORMAL: 0.7 - 1.2 cm)   LAID (Systole): 4.4 cm. (NORMAL: 1.9 - 3.8 cm)   Aortic Root Diameter (Diastole): 3.8 cm. (NORMAL: 2.0 - 3.7 cm)   DOPPLER:  E/A ratio 0.7 (NORMAL 0.8-2.0)   DT: 296 msec (NORMAL 140-240 msec.)   IVRT 144 m/sec (NORMAL  m/sec.)   E/E': 16 (NORMAL <8 avg.)   COMMENTS:  The patient underwent 2-D, M-Mode, and Doppler examination, including pulse-wave, continuous-wave, and color-flow analysis; the study is technically adequate.   FINDINGS:  AORTIC VALVE: Fibrotic.   MITRAL VALVE: Fibrocalcific. There is a calcified mitral annulus.   TRICUSPID VALVE: Normal.   PULMONIC VALVE: Not well visualized.   LEFT ATRIUM: Normal. No intracavitary masses or clots seen. LA volume index is 32 mL/m2.   AORTIC ROOT: Normal in size with adequate motion.   LEFT VENTRICLE: Normal left ventricular systolic function. Ejection fraction 51%.   RIGHT ATRIUM: Normal.   RIGHT VENTRICLE: Normal size and function.   PERICARDIUM: Unremarkable. No evidence of effusion.   INFERIOR VENA CAVA: Diameter is 1.3 cm.   DOPPLER: Doppler examination of the aortic, mitral, tricuspid, and pulmonary valves was performed. Normal pulmonary artery " systolic pressure by Doppler. No significant valvular abnormalities.   Faxed: 01/18/2021      CONCLUSION:  1.  Fibrocalcific mitral and aortic valves.   2.  Normal left ventricular systolic function.   3.  No significant valvular abnormalities noted.        MD DIANA Pompa/pap                 Electronically Signed by: Lilian Lopez-, Other -Author on January 18, 2021 01:25:42 PM  Electronically Co-signed by: Jaime Cui MD -Reviewer on January 20, 2021 10:48:03 AM

## 2021-05-10 NOTE — H&P
History and Physical      Patient Name: Min Trejo   Patient ID: [id number]   Sex: Male   YOB: 1960    Primary Care Provider: Gayla MORILLO   Referring Provider: Gayla MORILLO    Visit Date: March 18, 2021    Provider: YISEL Newton   Location: [facility]   Location Address: [address] [place] [address], [address]  [place], [place]  [id number]   Location Phone: [phone]          History Of Present Illness  Min Trejo is a 60 year old /White male who presents for evaluation and treatment of:      Patient is here today to establish care.   Patient's previous PCP was Dr. Walker.    Patient is currently taking:  Bumetanide - history of heart attack (2015), high blood pressure- very well controlled at present   Pantoprazole - GERD, well controlled   Atorvastatin - hyperlipidemia, well controlled per pt report   Humalog & Tresiba - type 2 diabetes, unsure of last A1C, but pt reports is in good control.   Gabapentin - sciatic nerve pain.    Patient has a history of arthritis, depression, hypertension, hyperlipidemia, GERD, type 2 diabetes, and kidney disease.     The patient is currently in renal failure. Patient goes to dialysis 3x weekly - Monday, Wednesday, and Friday. Patient says his kidneys are functioning at 6%. Patient is on the transplant list at [facility]. Patient states he has a cousin that is going to give him a kidney. starting testing for that process this month. pts nephrologist is Dr. Sahu.     pt states he would like to see a specialist about a possible insulin pump. requests appt tues or thursday   Patient has a history of heart attack. Patient had a heart attack in 2015 along with a bypass surgery.     Patient does not consult any specialist at this time.     Patient's PHQ-9 score today in office is 13.- pt denies any issues with depression, states is mostly just fatigue from dialysis.     pt states was seeing [place] [facility] for  DM2 foot exams, but has not been for a while. states has hx of DM2 neuropathy, lost 2nd toe and 4th on right foot in 2 different ocassions.       Past Medical History  Disease Name Date Onset Notes   Acute back pain --  --    Arthritis --  --    Back Pain --  --    Buttock wound --  --    GERD (gastroesophageal reflux disease) --  --    History of heart attack --  --    Hyperlipemia --  --    Hypertension, essential --  --    Morbid Obesity; BMI Greater than 40 06/06/2014 --    MRSA - resistant Bacterial infection: unspecified --  Back and Foot   Pain, Back --  --    Pain, Cervical Spine --  --    Pain, Joint --  --    Pain, Leg --  --    Renal Failure --  --    Type 2 diabetes mellitus --  --          Past Surgical History  Procedure Name Date Notes   Abcess I & D --  Back and foot MRSA   Back Surgery , Cervical --  Decompression of C4-C5   Cholecystectomy --  --    Heart Bypass 2015 --          Medication List  Name Date Started Instructions   atorvastatin 40 mg oral tablet 03/18/2021 take 1 tablet (40 mg) by oral route once daily   bumetanide 2 mg oral tablet 03/18/2021 take 1 tablet (2 mg) by oral route 2 times per day   gabapentin 600 mg oral tablet 03/18/2021 take 1 tablet (600 mg) by oral route 3 times per day   Humalog KwikPen Insulin 100 unit/mL subcutaneous insulin pen 03/18/2021 inject by subcutaneous route per prescribers instructions. 20units SQ BID   pantoprazole 40 mg oral tablet,delayed release (DR/EC) 03/18/2021 take 1 tablet (40 mg) by oral route once daily   Tresiba FlexTouch U-100 100 unit/mL (3 mL) subcutaneous insulin pen 03/18/2021 inject by subcutaneous route per prescribers instructions. 20units SQ QD         Allergy List  Allergen Name Date Reaction Notes   NO KNOWN DRUG ALLERGIES --  --  --        Allergies Reconciled  Family Medical History  Disease Name Relative/Age Notes   Family history of stroke Mother/   --    Family history of heart disease Father/  Mother/   --          Social  History  Finding Status Start/Stop Quantity Notes   Alcohol Never --/-- --  --    Disabled --  --/-- --  --    Tobacco Former --/-- 1 ppd 03/18/2021 - Patient has tobacco history of 12 years.          Immunizations  NameDate Admin Mfg Trade Name Lot Number Route Inj VIS Given VIS Publication   [id number]/30/2020 SKB Fluzone Quadrivalent  NE NE 03/18/2021    Comments:          Review of Systems  · Constitutional  o Admits  o : fatigue   o Denies  o : night sweats  · Eyes  o Denies  o : double vision, blurred vision  · HENT  o Denies  o : vertigo, recent head injury  · Breasts  o Denies  o : abnormal changes in breast size, additional breast symptoms except as noted in the HPI  · Cardiovascular  o Denies  o : chest pain, irregular heart beats  · Respiratory  o Denies  o : shortness of breath, productive cough  · Gastrointestinal  o Denies  o : nausea, vomiting  · Genitourinary  o Denies  o : dysuria, urinary retention  · Integument  o Denies  o : hair growth change, new skin lesions  · Neurologic  o Denies  o : altered mental status, seizures  · Musculoskeletal  o Denies  o : joint swelling, limitation of motion  · Endocrine  o Denies  o : cold intolerance, heat intolerance  · Heme-Lymph  o Denies  o : petechiae, lymph node enlargement or tenderness  · Allergic-Immunologic  o Denies  o : frequent illnesses      Vitals  Date Time BP Position Site L\R Cuff Size HR RR TEMP (F) WT  HT  BMI kg/m2 BSA m2 O2 Sat FR L/min FiO2 HC       03/18/2021 08:24 /48 Sitting    88 - R   285lbs 6oz 6'   38.7 2.56 98 %  21%          Physical Examination  · Constitutional  o Appearance  o : well developed, well-nourished, no acute distress  · Head and Face  o Head  o : normocephalic, atraumatic  · Ears, Nose, Mouth and Throat  o Ears  o :   § External Ears  § : external auditory canal appearance normal, no discharge present  § Otoscopic Examination  § : tympanic membranes pearly white/gray bilaterally  o Nose  o :   § External  Nose  § : no lesions noted  § Nasopharynx  § : no discharge present  o Oral Cavity  o :   § Oral Mucosa  § : oral mucosa light pink  o Throat  o :   § Oropharynx  § : tonsils without exudate, no palatal petechiae  · Neck  o Inspection/Palpation  o : normal appearance, no masses or tenderness, trachea midline  o Thyroid  o : gland size normal, nontender, no nodules or masses present on palpation  · Respiratory  o Respiratory Effort  o : breathing unlabored  o Inspection of Chest  o : chest rise symmetric bilaterally  o Auscultation of Lungs  o : clear to auscultation bilaterally throughout inspiration and expiration  · Cardiovascular  o Heart  o :   § Auscultation of Heart  § : regular rate and rhythm, no murmurs, gallops or rubs  o Peripheral Vascular System  o :   § Extremities  § : no edema  · Lymphatic  o Neck  o : no cervical lymphadenopathy, no supraclavicular lymphadenopathy  · Psychiatric  o Mood and Affect  o : mood normal, affect appropriate          Results  · In-Office Procedures  o Lab procedure  § IOP - Urine Drug Screen In-House [facility] (30665)   § Amphetamines Ur Ql: Negative   § Barbiturates Ur Ql: Negative   § Buprenorphine+Nor Ur Ql Scn: Negative   § Benzodiaz Ur Ql: Negative   § Cocaine Ur Ql: Negative   § Methadone Ur Ql: Negative   § Methamphet Ur Ql: Negative   § MDMA Ur Ql Scn: Negative   § Opiates Ur Ql: Negative   § Oxycodone Ur Ql: Negative   § PCP Ur Ql: Negative   § THC Ur Ql: Negative   § Temp in Range?: Within/Acceptable   § Control Seen?: Yes       Assessment  · Screening for depression     V79.0/Z13.89  · Diabetes mellitus, type 2     250.00/E11.9  · Hyperlipemia     272.4/E78.5  · Renal Failure     586/N19  · Back Pain     338.29  · Arthritis     V13.4/V13.4  · Establishing care with new doctor, encounter for     V65.8/Z76.89  · History of amputation     V49.5/Z89.9  · Diabetic nephropathy       Type 2 diabetes mellitus with diabetic  nephropathy     250.40/E11.21      Plan  · Orders  o Annual depression screening, 15 minutes (72596, ) - V79.0/Z13.89 - 03/18/2021  o ACO-18: Positive screen for clinical depression using a standardized tool and a follow-up plan documented () - V79.0/Z13.89 - 03/18/2021  o Hgb A1c [facility] (04918) - 250.00/E11.9 - 03/18/2021  o ENDOCRINOLOGY CONSULTATION (ENDOC) - 250.00/E11.9 - 03/18/2021   Jasmine Krishnamurthy, pt interested in possible insulin pump    need to be Tuesday or Thursday, has dialysis M,W,F   o PODIATRY CONSULTATION (PODIA) - 250.00/E11.9, V49.5/Z89.9 - 03/18/2021   [place] [facility]- pt has seen in past    needs to be Tuesday or Thursday due to dialysis M,W,F   o MARICRUZ Report (KASPR) - - 03/18/2021  o ACO-39: Current medications updated and reviewed (1159F, ) - - 03/18/2021  · Medications  o atorvastatin 40 mg oral tablet   SIG: take 1 tablet (40 mg) by oral route once daily   DISP: (90) Tablet with 1 refills  Prescribed on 03/18/2021     o bumetanide 2 mg oral tablet   SIG: take 1 tablet (2 mg) by oral route 2 times per day   DISP: (180) Tablet with 1 refills  Prescribed on 03/18/2021     o gabapentin 600 mg oral tablet   SIG: take 1 tablet (600 mg) by oral route 3 times per day   DISP: (90) Tablet with 2 refills  Prescribed on 03/18/2021     o Humalog KwikPen Insulin 100 unit/mL subcutaneous insulin pen   SIG: inject by subcutaneous route per prescribers instructions. 20units SQ BID   DISP: (12) Pre-filled Pen Syringe with 1 refills  Prescribed on 03/18/2021     o pantoprazole 40 mg oral tablet,delayed release (DR/EC)   SIG: take 1 tablet (40 mg) by oral route once daily   DISP: (90) Tablet with 1 refills  Prescribed on 03/18/2021     o Tresiba FlexTouch U-100 100 unit/mL (3 mL) subcutaneous insulin pen   SIG: inject by subcutaneous route per prescribers instructions. 20units SQ QD   DISP: (6) Pre-filled Pen Syringe with 1 refills  Prescribed on 03/18/2021     o Medications have been  Reconciled  o Transition of Care or Provider Policy  · Instructions  o Depression Screen completed and scanned into the EMR under the designated folder within the patient's documents.  o Today's PHQ-9 result is _13__  o Obtained a written consent for MARICRUZ query. Discussed the risk and benefits of the use of controlled substances with the patient, including the risk of tolerance and drug dependence. The patient has been counseled on the need to have an exit strategy, including potentially discontinuing the use of controlled substances. MARICRUZ has or will be reviewed as soon as it becomes avaliable.  o See note for indication of controlled substance. Maricruz and drug screen have been reviewed. Controlled substance agreement signed and scanned into chart. After discussion of risks and benefits of medication, I have determined patient is suitable for Rx while demonstrating the ability to safely follow and administer the medication plan. Patient understands the expectation that medication directions cannot be adjusted without a provider's written approval.   o Take all medications as prescribed/directed.  o Patient was educated/instructed on their diagnosis, treatment and medications prior to discharge from the clinic today.  o Patient instructed to seek medical attention urgently for new or worsening symptoms.  o Call the office with any concerns or questions.  o Chronic conditions reviewed and taken into consideration for today's treatment plan.  · Disposition  o Call or Return if symptoms worsen or persist.  o Follow Up PRN  o Follow Up in 3 months            Electronically Signed by: YISEL Newton -Author on March 18, 2021 02:53:29 PM

## 2021-05-10 NOTE — H&P
History and Physical      Patient Name: Min Trejo   Patient ID: [id number]   Sex: Male   YOB: 1960    Primary Care Provider: Tavon Null MD   Referring Provider: Shanel Sahu MD    Visit Date: December 18, 2020    Provider: Jaime Cui MD   [facility]: [facility]   Location Address: [address] [place] [address], [address]   TALIA Combs  [id number]   Location Phone: [phone]          Chief Complaint     Coronary artery disease with history of coronary artery bypass graft surgery.  History of left LV thrombus.  End-stage renal disease.      Nonspecific chest pain.       History Of Present Illness  Consult requested by: Shanel Sahu MD   Min Trejo is a 60 year old /White male with a history of coronary artery disease status post coronary artery bypass graft surgery and history of LV thrombus who has not seen a cardiologist for 5 years. He wants to establish a cardiologist. He has some nonspecific chest pain, lasting for a few minutes, appears to be musculoskeletal. He has shortness of breath on exertion. No PND. No orthopnea.   PAST MEDICAL HISTORY: Coronary artery disease status post CABG; Diabetes mellitus; End-stage renal disease; Hypertension.   FAMILY HISTORY: Positive for coronary artery disease, diabetes mellitus, and hypertension.   PSYCHOSOCIAL HISTORY: Denies tobacco use. Denies alcohol use. Rare caffeine. .   CURRENT MEDICATIONS: Bumetanide 2 mg b.i.d.; pantoprazole 40 mg daily; atorvastatin 40 mg daily; sertraline 100 mg daily; gabapentin 600 mg b.i.d.; Tresiba insulin.   ALLERGIES: No known drug allergies.       Review of Systems  · Constitutional  o Admits  o : good general health lately, recent weight changes   o Denies  o : fatigue  · Eyes  o Admits  o : blurred vision  · HENT  o Denies  o : hearing loss or ringing, chronic sinus problem, swollen glands in neck  · Cardiovascular  o Admits  o : shortness of breath with activities   o Denies  o :  chest pain, palpitations (fast, fluttering, or skipping beats), swelling (feet, ankles, hands)  · Respiratory  o Denies  o : asthma or wheezing, COPD  · Gastrointestinal  o Denies  o : ulcers, nausea or vomiting  · Neurologic  o Denies  o : lightheaded or dizzy, stroke, headaches  · Musculoskeletal  o Admits  o : joint pain, back pain  · Endocrine  o Admits  o : diabetes  o Denies  o : thyroid disease, heat or cold intolerance, excessive thirst or urination  · Heme-Lymph  o Denies  o : bleeding or bruising tendency, anemia      Vitals  Date Time BP Position Site L\R Cuff Size HR RR TEMP (F) WT  HT  BMI kg/m2 BSA m2 O2 Sat FR L/min FiO2 HC       12/18/2020 10:21 /70 Sitting    72 - R   293lbs 16oz 6'   39.87 2.6       12/18/2020 10:21 /64 Sitting    68 - R                   Physical Examination  · Constitutional  o Appearance  o : Awake, alert, cooperative, pleasant.  · Eyes  o Pupils and Irises  o : Pupils equal and reacting to light and accommodation.  · Ears, Nose, Mouth and Throat  o Ears  o : Tympanic membranes are normal.  o Nose  o : Clear with no maxillary tenderness.  o Oral Cavity  o : Clear.  · Neck  o Inspection/Palpation  o : No JVD, bruits, thyromegaly, or adenopathy. Trachea midline. No axillary or cervical lymphadenopathy.  o Thyroid  o : No thyroid enlargement.   · Respiratory  o Inspection of Chest  o : No chest wall deformities, moving equal.  o Auscultation of Lungs  o : Good air entry with vesicular breath sounds.  o Percussion of Chest  o : Resonant bilaterally.   o Palpation of Chest  o : Equal movements bilaterally.  · Cardiovascular  o Heart  o :   § Auscultation of Heart  § : PMI is in the 5th intercostal space. S1 and S2 regular. No S3. No S4.   o Peripheral Vascular System  o :   § Extremities  § : No pedal edema. Peripheral pulses well felt. No cyanosis.  · Gastrointestinal  o Abdominal Examination  o : No organomegaly, masses, tenderness, bruits, or abnormal pulsations. Bowel  sounds are normal.  · Musculoskeletal  o General  o : Muscle strength is normal with normal tone.  · Skin and Subcutaneous Tissue  o General Inspection  o : No rash, petechiae, or suspicious skin lesions. Skin turgor is normal.  · EKG  o EKG  o : Performed in the office today.  o Indications  o : Coronary artery disease.   o Results  o : Sinus rhythm. Prolonged MI interval. Left atrial enlargement. Intraventricular conduction delay.           Assessment     ASSESSMENT & PLAN:     1.  Coronary artery disease status post coronary artery bypass graft surgery with abnormal EKG and atypical        chest pain.  We will do a Lexiscan stress test to rule out any significant ischemia.  He cannot walk on a        treadmill.  Continue current dose of aspirin.    2.  Cardiomyopathy and coronary artery disease.  Repeat echocardiogram to evaluate his left ventricular        systolic function and evaluate for any left ventricular thrombus.  His left ventricular thrombus has resolved,        and he is not on anticoagulation now.    3.  Essential hypertension.  Monitor blood pressure regularly.  Restart antihypertensive if his blood pressure is        high.  He says it runs okay at home.    4.  Hyperlipidemia.  Continue current dose of Lipitor.             Electronically Signed by: Jasmine Alvarez-, Other -Author on December 28, 2020 05:28:03 AM  Electronically Co-signed by: Jaime Cui MD -Reviewer on December 30, 2020 07:50:04 AM

## 2021-05-14 VITALS
DIASTOLIC BLOOD PRESSURE: 48 MMHG | WEIGHT: 285.37 LBS | SYSTOLIC BLOOD PRESSURE: 108 MMHG | OXYGEN SATURATION: 98 % | BODY MASS INDEX: 38.65 KG/M2 | HEIGHT: 72 IN | HEART RATE: 88 BPM

## 2021-05-14 VITALS
HEART RATE: 72 BPM | DIASTOLIC BLOOD PRESSURE: 70 MMHG | SYSTOLIC BLOOD PRESSURE: 154 MMHG | WEIGHT: 294 LBS | BODY MASS INDEX: 39.82 KG/M2 | HEIGHT: 72 IN

## 2021-05-16 VITALS — WEIGHT: 315 LBS | BODY MASS INDEX: 42.66 KG/M2 | HEIGHT: 72 IN | RESPIRATION RATE: 18 BRPM

## 2021-05-16 VITALS — BODY MASS INDEX: 42.66 KG/M2 | WEIGHT: 315 LBS | RESPIRATION RATE: 16 BRPM | HEIGHT: 72 IN

## 2021-06-08 NOTE — TELEPHONE ENCOUNTER
Call the patient of the pharmacy and verify which dose of the insulin is corrected to 20 units twice daily or 30?

## 2021-06-09 ENCOUNTER — TELEPHONE (OUTPATIENT)
Dept: FAMILY MEDICINE CLINIC | Facility: CLINIC | Age: 61
End: 2021-06-09

## 2021-06-09 RX ORDER — INSULIN LISPRO 100 [IU]/ML
INJECTION, SOLUTION INTRAVENOUS; SUBCUTANEOUS
Qty: 15 ML | Refills: 1 | Status: SHIPPED | OUTPATIENT
Start: 2021-06-09 | End: 2021-06-24 | Stop reason: CLARIF

## 2021-06-11 ENCOUNTER — TELEPHONE (OUTPATIENT)
Dept: FAMILY MEDICINE CLINIC | Facility: CLINIC | Age: 61
End: 2021-06-11

## 2021-06-24 ENCOUNTER — OFFICE VISIT (OUTPATIENT)
Dept: FAMILY MEDICINE CLINIC | Facility: CLINIC | Age: 61
End: 2021-06-24

## 2021-06-24 VITALS
DIASTOLIC BLOOD PRESSURE: 58 MMHG | HEART RATE: 86 BPM | WEIGHT: 292.8 LBS | OXYGEN SATURATION: 100 % | BODY MASS INDEX: 39.66 KG/M2 | HEIGHT: 72 IN | SYSTOLIC BLOOD PRESSURE: 100 MMHG | RESPIRATION RATE: 16 BRPM

## 2021-06-24 DIAGNOSIS — E11.65 TYPE 2 DIABETES MELLITUS WITH HYPERGLYCEMIA, WITH LONG-TERM CURRENT USE OF INSULIN (HCC): Primary | ICD-10-CM

## 2021-06-24 DIAGNOSIS — K21.9 GASTROESOPHAGEAL REFLUX DISEASE WITHOUT ESOPHAGITIS: ICD-10-CM

## 2021-06-24 DIAGNOSIS — Z79.4 TYPE 2 DIABETES MELLITUS WITH DIABETIC NEUROPATHY, WITH LONG-TERM CURRENT USE OF INSULIN (HCC): ICD-10-CM

## 2021-06-24 DIAGNOSIS — Z79.4 TYPE 2 DIABETES MELLITUS WITH HYPERGLYCEMIA, WITH LONG-TERM CURRENT USE OF INSULIN (HCC): Primary | ICD-10-CM

## 2021-06-24 DIAGNOSIS — Z87.891 HISTORY OF SMOKING 30 OR MORE PACK YEARS: ICD-10-CM

## 2021-06-24 DIAGNOSIS — E78.5 HYPERLIPIDEMIA, UNSPECIFIED HYPERLIPIDEMIA TYPE: ICD-10-CM

## 2021-06-24 DIAGNOSIS — E11.40 TYPE 2 DIABETES MELLITUS WITH DIABETIC NEUROPATHY, WITH LONG-TERM CURRENT USE OF INSULIN (HCC): ICD-10-CM

## 2021-06-24 DIAGNOSIS — I10 ESSENTIAL HYPERTENSION: ICD-10-CM

## 2021-06-24 PROCEDURE — 99214 OFFICE O/P EST MOD 30 MIN: CPT | Performed by: NURSE PRACTITIONER

## 2021-06-24 RX ORDER — ATORVASTATIN CALCIUM 40 MG/1
40 TABLET, FILM COATED ORAL NIGHTLY
Qty: 90 TABLET | Refills: 1 | Status: SHIPPED | OUTPATIENT
Start: 2021-06-24

## 2021-06-24 RX ORDER — GABAPENTIN 600 MG/1
600 TABLET ORAL 3 TIMES DAILY
Qty: 270 TABLET | Refills: 0 | Status: SHIPPED | OUTPATIENT
Start: 2021-06-24 | End: 2022-02-16

## 2021-06-24 RX ORDER — PANTOPRAZOLE SODIUM 40 MG/1
40 TABLET, DELAYED RELEASE ORAL EVERY MORNING
Qty: 90 TABLET | Refills: 1 | Status: SHIPPED | OUTPATIENT
Start: 2021-06-24 | End: 2022-03-01 | Stop reason: HOSPADM

## 2021-06-24 RX ORDER — GABAPENTIN 600 MG/1
600 TABLET ORAL 3 TIMES DAILY
Qty: 270 TABLET | Refills: 0 | Status: SHIPPED | OUTPATIENT
Start: 2021-06-24 | End: 2021-06-24

## 2021-06-24 RX ORDER — BUMETANIDE 2 MG/1
2 TABLET ORAL 2 TIMES DAILY
Qty: 180 TABLET | Refills: 1 | Status: SHIPPED | OUTPATIENT
Start: 2021-06-24 | End: 2021-09-02

## 2021-06-24 NOTE — PROGRESS NOTES
Chief Complaint  Diabetes, Hyperlipidemia, and Hypertension    Subjective          Min Trejo presents to [facility] for   History of Present Illness    Patient is here for a follow up on HLD, DM2,-controlled at present, last A1c 7.2% HTN,-well-controlled at present on Bumex, CKD. -In dialysis seeing Dr. José Ozuna   Patient currently doing dialysis 3 times a week.  Just had dialysis earlier today.    Still in the process of trying to get a kidney transplant.    States he needs to switch all of his medications over to [facility] other than the gabapentin.    Patient admits he was previous smoker for approximately 35 years, states he smoked 1 pack/day.  Quit smoking 2 years ago.    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   • Heart attack (CMS/HCC)     AGE 54   • History of transfusion    • Hypertension    • Kidney disease, chronic, stage IV (GFR 15-29 ml/min) (CMS/HCC)    • Methicillin resistant Staphylococcus aureus septicemia (CMS/HCC)     JULY 2018     Surgical History  Past Surgical History:   Procedure Laterality Date   • AMPUTATION FOOT / TOE      Surgery Left Foot Amputation DIP Fourth Toe AND SECOND   • ARTERIOVENOUS FISTULA/SHUNT SURGERY Left 2/1/2019    Procedure: left arm fistula formation;  Surgeon: Daniel Solis MD;  Location: [facility];  Service: Vascular   • CATARACT EXTRACTION, BILATERAL     • CHOLECYSTECTOMY     • CORONARY ARTERY BYPASS GRAFT      x 4   • EYE SURGERY      for hemorrhaging   • LUMBAR DISCECTOMY  11/25/2013    Spinal Diskectomy Lumbar / Description: L4/5, L5/S1 MAST discectomy Dr. Chirinos 11/25/13   • OTHER SURGICAL HISTORY      Ear Pressure Equalization Tube,  "Insertion, Bilaterally   • PILONIDAL CYSTECTOMY      X3   • TONSILLECTOMY     • TONSILLECTOMY     • WRIST SURGERY      LEFT, FRACTURE     Social History  Social History     Socioeconomic History   • Marital status:      Spouse name: Not on file   • Number of children: Not on file   • Years of education: Not on file   • Highest education level: Not on file   Tobacco Use   • Smoking status: Current Every Day Smoker     Packs/day: 1.00     Years: 30.00     Pack years: 30.00     Types: Cigarettes   • Smokeless tobacco: Never Used   Substance and Sexual Activity   • Alcohol use: No   • Drug use: No   • Sexual activity: Defer       Current Outpatient Medications:   •  atorvastatin (LIPITOR) 40 MG tablet, Take 1 tablet by mouth Every Night., Disp: 90 tablet, Rfl: 1  •  bumetanide (BUMEX) 2 MG tablet, Take 1 tablet by mouth 2 (Two) Times a Day., Disp: 180 tablet, Rfl: 1  •  Insulin Degludec (TRESIBA SC), Inject 22 Units under the skin into the appropriate area as directed Every Morning., Disp: , Rfl:   •  pantoprazole (PROTONIX) 40 MG EC tablet, Take 1 tablet by mouth Every Morning., Disp: 90 tablet, Rfl: 1  •  gabapentin (NEURONTIN) 600 MG tablet, Take 1 tablet by mouth 3 (Three) Times a Day., Disp: 270 tablet, Rfl: 0  •  insulin aspart (NovoLOG) 100 UNIT/ML injection, Inject 20 Units under the skin into the appropriate area as directed 2 (Two) Times a Day., Disp: 12 each, Rfl: 1    Review of Systems     Objective     /58 (BP Location: Right arm, Patient Position: Sitting, Cuff Size: Large Adult)   Pulse 86   Resp 16   Ht 182.9 cm (72\")   Wt 133 kg (292 lb 12.8 oz)   SpO2 100%   BMI 39.71 kg/m²     Body mass index is 39.71 kg/m².    Physical Exam  Vitals reviewed.   Constitutional:       Appearance: Normal appearance. He is well-developed.      Comments: Patient bit weak looking presently, states he always feels this way after dialysis   HENT:      Head: Normocephalic and atraumatic.      Right Ear: " External ear normal.      Left Ear: External ear normal.      Mouth/Throat:      Pharynx: No oropharyngeal exudate.   Eyes:      Conjunctiva/sclera: Conjunctivae normal.      Pupils: Pupils are equal, round, and reactive to light.   Cardiovascular:      Rate and Rhythm: Normal rate and regular rhythm.      Heart sounds: No murmur heard.   No friction rub. No gallop.    Pulmonary:      Effort: Pulmonary effort is normal.      Breath sounds: Normal breath sounds. No wheezing or rhonchi.   Abdominal:      General: There is no distension.   Skin:     General: Skin is warm and dry.   Neurological:      Mental Status: He is alert and oriented to person, place, and time.      Cranial Nerves: No cranial nerve deficit.   Psychiatric:         Mood and Affect: Mood and affect normal.         Behavior: Behavior normal.         Thought Content: Thought content normal.         Judgment: Judgment normal.         Result Review :     The following data was reviewed by: YISEL Newton on 06/24/2021:    CMP    CMP 7/30/20 8/17/20 3/30/21   Glucose 140 (A) 263 (A)    BUN 52 (A) 26 (A)    Creatinine 8.60 (A) 4.74 (A)    Sodium 144 138    Potassium 4.6 3.7    Chloride 95 (A) 94 (A)    Calcium 10.0 9.6    Albumin 4.3 3.7 4.7   Total Bilirubin 0.36 0.22 0.6   Alkaline Phosphatase 107 93 106   AST (SGOT) 14 (A) 16 19   ALT (SGPT) 18 15 16   (A) Abnormal value       Comments are available for some flowsheets but are not being displayed.           CBC    CBC 7/30/20 8/17/20 3/30/21   WBC 9.44 9.98 8.79   RBC 5.09 4.20 (A) 4.12 (A)   Hemoglobin 14.6 12.1 (A) 11.5 (A)   Hematocrit 45.1 37.8 (A) 36.9 (A)   MCV 88.6 90.0 89.6   MCH 28.7 28.8 27.9   MCHC 32.4 (A) 32.0 (A) 31.2   RDW 14.0 14.0 14.1   Platelets 274 222 270   (A) Abnormal value                    Last A1c was 7.2%                       Assessment & plan:   Diagnoses and all orders for this visit:    1. Type 2 diabetes mellitus with hyperglycemia, with long-term current use of  insulin (CMS/HCC) (Primary)  -     Discontinue: gabapentin (NEURONTIN) 600 MG tablet; Take 1 tablet by mouth 3 (Three) Times a Day.  Dispense: 270 tablet; Refill: 0  -     Comprehensive Metabolic Panel; Future  -     Lipid Panel; Future  -     Hemoglobin A1c; Future  -     Microalbumin / Creatinine Urine Ratio - Urine, Clean Catch; Future  -     gabapentin (NEURONTIN) 600 MG tablet; Take 1 tablet by mouth 3 (Three) Times a Day.  Dispense: 270 tablet; Refill: 0    2. Type 2 diabetes mellitus with diabetic neuropathy, with long-term current use of insulin (CMS/HCC)  -     Discontinue: gabapentin (NEURONTIN) 600 MG tablet; Take 1 tablet by mouth 3 (Three) Times a Day.  Dispense: 270 tablet; Refill: 0  -     gabapentin (NEURONTIN) 600 MG tablet; Take 1 tablet by mouth 3 (Three) Times a Day.  Dispense: 270 tablet; Refill: 0    3. History of smoking 30 or more pack years  -      CT Chest Low Dose Cancer Screening WO; Future    4. Essential hypertension  -     bumetanide (BUMEX) 2 MG tablet; Take 1 tablet by mouth 2 (Two) Times a Day.  Dispense: 180 tablet; Refill: 1    5. Hyperlipidemia, unspecified hyperlipidemia type  -     Lipid Panel; Future  -     atorvastatin (LIPITOR) 40 MG tablet; Take 1 tablet by mouth Every Night.  Dispense: 90 tablet; Refill: 1    6. Gastroesophageal reflux disease without esophagitis    Other orders  -     insulin aspart (NovoLOG) 100 UNIT/ML injection; Inject 20 Units under the skin into the appropriate area as directed 2 (Two) Times a Day.  Dispense: 12 each; Refill: 1  -     pantoprazole (PROTONIX) 40 MG EC tablet; Take 1 tablet by mouth Every Morning.  Dispense: 90 tablet; Refill: 1                Follow Up     No follow-ups on file.    Patient was given instructions and counseling regarding his condition or for health maintenance advice. Please see specific information pulled into the AVS if appropriate.     Gayla Cota, YISEL  06/24/2021

## 2021-07-01 ENCOUNTER — TELEPHONE (OUTPATIENT)
Dept: FAMILY MEDICINE CLINIC | Facility: CLINIC | Age: 61
End: 2021-07-01

## 2021-07-01 NOTE — TELEPHONE ENCOUNTER
Pharmacy Name: [facility] - [facility] [address] - [phone]  - [phone]      Pharmacy representative name: ISABELL     Pharmacy representative phone number: [phone] REFERENCE [phone]    What medication are you calling in regards to: insulin aspart (NovoLOG) 100 UNIT/ML injection    What question does the pharmacy have: PHARMACY NEEDS CLARIFICATION ON THE DOSAGE FORM OF THE MEDICATION    Who is the provider that prescribed the medication:     Additional notes:

## 2021-07-19 ENCOUNTER — TELEPHONE (OUTPATIENT)
Dept: FAMILY MEDICINE CLINIC | Facility: CLINIC | Age: 61
End: 2021-07-19

## 2021-07-19 ENCOUNTER — HOSPITAL ENCOUNTER (OUTPATIENT)
Dept: CT IMAGING | Facility: HOSPITAL | Age: 61
Discharge: HOME | End: 2021-07-19
Admitting: NURSE PRACTITIONER

## 2021-07-19 DIAGNOSIS — Z87.891 HISTORY OF SMOKING 30 OR MORE PACK YEARS: ICD-10-CM

## 2021-07-19 PROCEDURE — 71271 CT THORAX LUNG CANCER SCR C-: CPT

## 2021-07-21 ENCOUNTER — TELEPHONE (OUTPATIENT)
Dept: FAMILY MEDICINE CLINIC | Facility: CLINIC | Age: 61
End: 2021-07-21

## 2021-07-21 NOTE — TELEPHONE ENCOUNTER
Caller: JUANA    Relationship: Other    Best call back number:[phone]    Medication needed:     NEW GLUCOMETER AND ALL TESTING SUPPLIES NEEDED TO CHECK SUGAR    Requested Prescriptions      No prescriptions requested or ordered in this encounter       When do you need the refill by: 7-21-21    What additional details did the patient provide when requesting the medication: STATES THAT PATIENT HASN'T BEEN CHECKING HIS SUGAR LEVELS PRIOR TO TAKING MEDICATION. DOESN'T HAVE THE EQUIPMENT OR SUPPLIES TO DO. IF YOU COULD DO A 90 SUPPLY [facility] WOULD COVER ALL.     Does the patient have less than a 3 day supply:  [x] Yes  [] No    What is the patient's preferred pharmacy:    [facility] - [facility] [address] - [phone]  - [phone]   [phone]

## 2021-08-23 ENCOUNTER — APPOINTMENT (OUTPATIENT)
Dept: GENERAL RADIOLOGY | Facility: HOSPITAL | Age: 61
End: 2021-08-23

## 2021-08-23 ENCOUNTER — HOSPITAL ENCOUNTER (INPATIENT)
Facility: HOSPITAL | Age: 61
LOS: 2 days | Discharge: HOME | End: 2021-08-25
Attending: EMERGENCY MEDICINE | Admitting: FAMILY MEDICINE

## 2021-08-23 DIAGNOSIS — U07.1 COVID-19: Primary | ICD-10-CM

## 2021-08-23 PROBLEM — J96.01 ACUTE HYPOXEMIC RESPIRATORY FAILURE DUE TO COVID-19 (HCC): Status: ACTIVE | Noted: 2021-08-23

## 2021-08-23 LAB
ALBUMIN SERPL-MCNC: 3.1 G/DL (ref 3.5–5.2)
ALBUMIN/GLOB SERPL: 0.8 G/DL
ALP SERPL-CCNC: 70 U/L (ref 39–117)
ALT SERPL W P-5'-P-CCNC: 27 U/L (ref 1–41)
ANION GAP SERPL CALCULATED.3IONS-SCNC: 19.1 MMOL/L (ref 5–15)
AST SERPL-CCNC: 20 U/L (ref 1–40)
BASOPHILS # BLD AUTO: 0.03 10*3/MM3 (ref 0–0.2)
BASOPHILS NFR BLD AUTO: 0.5 % (ref 0–1.5)
BILIRUB SERPL-MCNC: 0.4 MG/DL (ref 0–1.2)
BUN SERPL-MCNC: 96 MG/DL (ref 6–20)
BUN/CREAT SERPL: 10.1 (ref 7–25)
CALCIUM SPEC-SCNC: 8.3 MG/DL (ref 8.6–10.5)
CHLORIDE SERPL-SCNC: 96 MMOL/L (ref 98–107)
CK SERPL-CCNC: 160 U/L (ref 20–200)
CO2 SERPL-SCNC: 19.9 MMOL/L (ref 22–29)
CREAT SERPL-MCNC: 9.53 MG/DL (ref 0.76–1.27)
CRP SERPL-MCNC: 7.18 MG/DL (ref 0–0.5)
D DIMER PPP FEU-MCNC: 1.23 MG/L (FEU) (ref 0–0.59)
D-LACTATE SERPL-SCNC: 1.4 MMOL/L (ref 0.5–2)
DEPRECATED RDW RBC AUTO: 46.1 FL (ref 37–54)
EOSINOPHIL # BLD AUTO: 0.05 10*3/MM3 (ref 0–0.4)
EOSINOPHIL NFR BLD AUTO: 0.8 % (ref 0.3–6.2)
ERYTHROCYTE [DISTWIDTH] IN BLOOD BY AUTOMATED COUNT: 14.3 % (ref 12.3–15.4)
ERYTHROCYTE [SEDIMENTATION RATE] IN BLOOD: 82 MM/HR (ref 0–20)
FERRITIN SERPL-MCNC: 1198 NG/ML (ref 30–400)
GFR SERPL CREATININE-BSD FRML MDRD: 6 ML/MIN/1.73
GFR SERPL CREATININE-BSD FRML MDRD: ABNORMAL ML/MIN/{1.73_M2}
GLOBULIN UR ELPH-MCNC: 3.7 GM/DL
GLUCOSE BLDC GLUCOMTR-MCNC: 309 MG/DL (ref 70–99)
GLUCOSE BLDC GLUCOMTR-MCNC: 323 MG/DL (ref 70–99)
GLUCOSE SERPL-MCNC: 206 MG/DL (ref 65–99)
HCT VFR BLD AUTO: 39.7 % (ref 37.5–51)
HGB BLD-MCNC: 12.8 G/DL (ref 13–17.7)
IMM GRANULOCYTES # BLD AUTO: 0.06 10*3/MM3 (ref 0–0.05)
IMM GRANULOCYTES NFR BLD AUTO: 1 % (ref 0–0.5)
LDH SERPL-CCNC: 278 U/L (ref 135–225)
LYMPHOCYTES # BLD AUTO: 0.81 10*3/MM3 (ref 0.7–3.1)
LYMPHOCYTES NFR BLD AUTO: 12.9 % (ref 19.6–45.3)
MAGNESIUM SERPL-MCNC: 2.3 MG/DL (ref 1.6–2.6)
MCH RBC QN AUTO: 28.4 PG (ref 26.6–33)
MCHC RBC AUTO-ENTMCNC: 32.2 G/DL (ref 31.5–35.7)
MCV RBC AUTO: 88 FL (ref 79–97)
MONOCYTES # BLD AUTO: 0.44 10*3/MM3 (ref 0.1–0.9)
MONOCYTES NFR BLD AUTO: 7 % (ref 5–12)
NEUTROPHILS NFR BLD AUTO: 4.87 10*3/MM3 (ref 1.7–7)
NEUTROPHILS NFR BLD AUTO: 77.8 % (ref 42.7–76)
NRBC BLD AUTO-RTO: 0 /100 WBC (ref 0–0.2)
NT-PROBNP SERPL-MCNC: 8506 PG/ML (ref 0–900)
PHOSPHATE SERPL-MCNC: 4.5 MG/DL (ref 2.5–4.5)
PLATELET # BLD AUTO: 336 10*3/MM3 (ref 140–450)
PMV BLD AUTO: 10.3 FL (ref 6–12)
POTASSIUM SERPL-SCNC: 3.1 MMOL/L (ref 3.5–5.2)
PROCALCITONIN SERPL-MCNC: 0.44 NG/ML (ref 0–0.25)
PROT SERPL-MCNC: 6.8 G/DL (ref 6–8.5)
QT INTERVAL: 418 MS
RBC # BLD AUTO: 4.51 10*6/MM3 (ref 4.14–5.8)
RBC MORPH BLD: NORMAL
SMALL PLATELETS BLD QL SMEAR: ADEQUATE
SODIUM SERPL-SCNC: 135 MMOL/L (ref 136–145)
TROPONIN T SERPL-MCNC: 0.07 NG/ML (ref 0–0.03)
WBC # BLD AUTO: 6.26 10*3/MM3 (ref 3.4–10.8)
WBC MORPH BLD: NORMAL

## 2021-08-23 PROCEDURE — 84100 ASSAY OF PHOSPHORUS: CPT | Performed by: FAMILY MEDICINE

## 2021-08-23 PROCEDURE — 87186 SC STD MICRODIL/AGAR DIL: CPT | Performed by: FAMILY MEDICINE

## 2021-08-23 PROCEDURE — 94799 UNLISTED PULMONARY SVC/PX: CPT

## 2021-08-23 PROCEDURE — 87205 SMEAR GRAM STAIN: CPT | Performed by: FAMILY MEDICINE

## 2021-08-23 PROCEDURE — 63710000001 INSULIN LISPRO (HUMAN) PER 5 UNITS: Performed by: FAMILY MEDICINE

## 2021-08-23 PROCEDURE — 93005 ELECTROCARDIOGRAM TRACING: CPT | Performed by: EMERGENCY MEDICINE

## 2021-08-23 PROCEDURE — 87070 CULTURE OTHR SPECIMN AEROBIC: CPT | Performed by: FAMILY MEDICINE

## 2021-08-23 PROCEDURE — 82962 GLUCOSE BLOOD TEST: CPT

## 2021-08-23 PROCEDURE — 83615 LACTATE (LD) (LDH) ENZYME: CPT | Performed by: FAMILY MEDICINE

## 2021-08-23 PROCEDURE — 94640 AIRWAY INHALATION TREATMENT: CPT

## 2021-08-23 PROCEDURE — 5A1D70Z PERFORMANCE OF URINARY FILTRATION, INTERMITTENT, LESS THAN 6 HOURS PER DAY: ICD-10-PCS | Performed by: FAMILY MEDICINE

## 2021-08-23 PROCEDURE — 85007 BL SMEAR W/DIFF WBC COUNT: CPT | Performed by: EMERGENCY MEDICINE

## 2021-08-23 PROCEDURE — 85025 COMPLETE CBC W/AUTO DIFF WBC: CPT | Performed by: EMERGENCY MEDICINE

## 2021-08-23 PROCEDURE — 71045 X-RAY EXAM CHEST 1 VIEW: CPT

## 2021-08-23 PROCEDURE — 83735 ASSAY OF MAGNESIUM: CPT | Performed by: FAMILY MEDICINE

## 2021-08-23 PROCEDURE — 80053 COMPREHEN METABOLIC PANEL: CPT | Performed by: EMERGENCY MEDICINE

## 2021-08-23 PROCEDURE — 87040 BLOOD CULTURE FOR BACTERIA: CPT | Performed by: EMERGENCY MEDICINE

## 2021-08-23 PROCEDURE — 25010000002 HEPARIN (PORCINE) PER 1000 UNITS: Performed by: FAMILY MEDICINE

## 2021-08-23 PROCEDURE — 99223 1ST HOSP IP/OBS HIGH 75: CPT | Performed by: FAMILY MEDICINE

## 2021-08-23 PROCEDURE — 25010000002 AZITHROMYCIN PER 500 MG: Performed by: EMERGENCY MEDICINE

## 2021-08-23 PROCEDURE — 83605 ASSAY OF LACTIC ACID: CPT | Performed by: EMERGENCY MEDICINE

## 2021-08-23 PROCEDURE — 85652 RBC SED RATE AUTOMATED: CPT | Performed by: FAMILY MEDICINE

## 2021-08-23 PROCEDURE — 82728 ASSAY OF FERRITIN: CPT | Performed by: FAMILY MEDICINE

## 2021-08-23 PROCEDURE — 83880 ASSAY OF NATRIURETIC PEPTIDE: CPT | Performed by: EMERGENCY MEDICINE

## 2021-08-23 PROCEDURE — 25010000002 CEFTRIAXONE PER 250 MG: Performed by: EMERGENCY MEDICINE

## 2021-08-23 PROCEDURE — 25010000002 DEXAMETHASONE PER 1 MG: Performed by: EMERGENCY MEDICINE

## 2021-08-23 PROCEDURE — 82550 ASSAY OF CK (CPK): CPT | Performed by: FAMILY MEDICINE

## 2021-08-23 PROCEDURE — 99284 EMERGENCY DEPT VISIT MOD MDM: CPT

## 2021-08-23 PROCEDURE — 86140 C-REACTIVE PROTEIN: CPT | Performed by: FAMILY MEDICINE

## 2021-08-23 PROCEDURE — 85379 FIBRIN DEGRADATION QUANT: CPT | Performed by: FAMILY MEDICINE

## 2021-08-23 PROCEDURE — 84145 PROCALCITONIN (PCT): CPT | Performed by: FAMILY MEDICINE

## 2021-08-23 PROCEDURE — 84484 ASSAY OF TROPONIN QUANT: CPT | Performed by: EMERGENCY MEDICINE

## 2021-08-23 PROCEDURE — U0003 INFECTIOUS AGENT DETECTION BY NUCLEIC ACID (DNA OR RNA); SEVERE ACUTE RESPIRATORY SYNDROME CORONAVIRUS 2 (SARS-COV-2) (CORONAVIRUS DISEASE [COVID-19]), AMPLIFIED PROBE TECHNIQUE, MAKING USE OF HIGH THROUGHPUT TECHNOLOGIES AS DESCRIBED BY CMS-2020-01-R: HCPCS | Performed by: EMERGENCY MEDICINE

## 2021-08-23 PROCEDURE — 63710000001 INSULIN DETEMIR PER 5 UNITS: Performed by: FAMILY MEDICINE

## 2021-08-23 RX ORDER — DEXAMETHASONE SODIUM PHOSPHATE 10 MG/ML
10 INJECTION INTRAMUSCULAR; INTRAVENOUS ONCE
Status: COMPLETED | OUTPATIENT
Start: 2021-08-23 | End: 2021-08-23

## 2021-08-23 RX ORDER — DEXTROSE MONOHYDRATE 100 MG/ML
25 INJECTION, SOLUTION INTRAVENOUS
Status: DISCONTINUED | OUTPATIENT
Start: 2021-08-23 | End: 2021-08-25 | Stop reason: HOSPADM

## 2021-08-23 RX ORDER — BUDESONIDE 0.5 MG/2ML
0.5 INHALANT ORAL
Status: DISCONTINUED | OUTPATIENT
Start: 2021-08-23 | End: 2021-08-25 | Stop reason: HOSPADM

## 2021-08-23 RX ORDER — BUPROPION HYDROCHLORIDE 150 MG/1
150 TABLET, EXTENDED RELEASE ORAL 2 TIMES DAILY
COMMUNITY
End: 2022-03-01 | Stop reason: HOSPADM

## 2021-08-23 RX ORDER — HYDRALAZINE HYDROCHLORIDE 50 MG/1
50 TABLET, FILM COATED ORAL 2 TIMES DAILY
COMMUNITY
End: 2022-02-16

## 2021-08-23 RX ORDER — HEPARIN SODIUM 5000 [USP'U]/ML
5000 INJECTION, SOLUTION INTRAVENOUS; SUBCUTANEOUS EVERY 8 HOURS SCHEDULED
Status: DISCONTINUED | OUTPATIENT
Start: 2021-08-23 | End: 2021-08-25 | Stop reason: HOSPADM

## 2021-08-23 RX ORDER — DEXAMETHASONE SODIUM PHOSPHATE 10 MG/ML
6 INJECTION INTRAMUSCULAR; INTRAVENOUS DAILY
Status: DISCONTINUED | OUTPATIENT
Start: 2021-08-24 | End: 2021-08-25 | Stop reason: HOSPADM

## 2021-08-23 RX ORDER — SERTRALINE HYDROCHLORIDE 100 MG/1
100 TABLET, FILM COATED ORAL DAILY
COMMUNITY
End: 2022-02-16

## 2021-08-23 RX ORDER — ARFORMOTEROL TARTRATE 15 UG/2ML
15 SOLUTION RESPIRATORY (INHALATION)
Status: DISCONTINUED | OUTPATIENT
Start: 2021-08-23 | End: 2021-08-25 | Stop reason: HOSPADM

## 2021-08-23 RX ORDER — ASPIRIN 81 MG/1
81 TABLET ORAL DAILY
COMMUNITY
End: 2022-02-16

## 2021-08-23 RX ORDER — NICOTINE POLACRILEX 4 MG
15 LOZENGE BUCCAL
Status: DISCONTINUED | OUTPATIENT
Start: 2021-08-23 | End: 2021-08-25 | Stop reason: HOSPADM

## 2021-08-23 RX ORDER — IPRATROPIUM BROMIDE AND ALBUTEROL SULFATE 2.5; .5 MG/3ML; MG/3ML
3 SOLUTION RESPIRATORY (INHALATION)
Status: DISCONTINUED | OUTPATIENT
Start: 2021-08-23 | End: 2021-08-25 | Stop reason: HOSPADM

## 2021-08-23 RX ADMIN — DEXAMETHASONE SODIUM PHOSPHATE 10 MG: 10 INJECTION INTRAMUSCULAR; INTRAVENOUS at 14:07

## 2021-08-23 RX ADMIN — INSULIN DETEMIR 25 UNITS: 100 INJECTION, SOLUTION SUBCUTANEOUS at 21:18

## 2021-08-23 RX ADMIN — IPRATROPIUM BROMIDE AND ALBUTEROL SULFATE 3 ML: .5; 2.5 SOLUTION RESPIRATORY (INHALATION) at 20:13

## 2021-08-23 RX ADMIN — HEPARIN SODIUM 5000 UNITS: 5000 INJECTION, SOLUTION INTRAVENOUS; SUBCUTANEOUS at 21:17

## 2021-08-23 RX ADMIN — SODIUM CHLORIDE 1 G: 9 INJECTION INTRAMUSCULAR; INTRAVENOUS; SUBCUTANEOUS at 14:09

## 2021-08-23 RX ADMIN — INSULIN LISPRO 7 UNITS: 100 INJECTION, SOLUTION INTRAVENOUS; SUBCUTANEOUS at 21:17

## 2021-08-23 RX ADMIN — AZITHROMYCIN MONOHYDRATE 500 MG: 500 INJECTION, POWDER, LYOPHILIZED, FOR SOLUTION INTRAVENOUS at 14:13

## 2021-08-23 RX ADMIN — Medication: at 21:17

## 2021-08-23 RX ADMIN — BUDESONIDE 0.5 MG: 0.5 INHALANT RESPIRATORY (INHALATION) at 20:13

## 2021-08-23 RX ADMIN — ARFORMOTEROL TARTRATE 15 MCG: 15 SOLUTION RESPIRATORY (INHALATION) at 20:13

## 2021-08-24 LAB
ALBUMIN SERPL-MCNC: 2.9 G/DL (ref 3.5–5.2)
ALBUMIN/GLOB SERPL: 0.7 G/DL
ALP SERPL-CCNC: 72 U/L (ref 39–117)
ALT SERPL W P-5'-P-CCNC: 28 U/L (ref 1–41)
ANION GAP SERPL CALCULATED.3IONS-SCNC: 17.5 MMOL/L (ref 5–15)
AST SERPL-CCNC: 21 U/L (ref 1–40)
BASOPHILS # BLD AUTO: 0.02 10*3/MM3 (ref 0–0.2)
BASOPHILS NFR BLD AUTO: 0.4 % (ref 0–1.5)
BILIRUB SERPL-MCNC: 0.3 MG/DL (ref 0–1.2)
BUN SERPL-MCNC: 56 MG/DL (ref 6–20)
BUN/CREAT SERPL: 8.2 (ref 7–25)
CALCIUM SPEC-SCNC: 8.4 MG/DL (ref 8.6–10.5)
CHLORIDE SERPL-SCNC: 97 MMOL/L (ref 98–107)
CO2 SERPL-SCNC: 20.5 MMOL/L (ref 22–29)
CREAT SERPL-MCNC: 6.8 MG/DL (ref 0.76–1.27)
CRP SERPL-MCNC: 5.55 MG/DL (ref 0–0.5)
D DIMER PPP FEU-MCNC: 1.35 MG/L (FEU) (ref 0–0.59)
DEPRECATED RDW RBC AUTO: 46.3 FL (ref 37–54)
EOSINOPHIL # BLD AUTO: 0 10*3/MM3 (ref 0–0.4)
EOSINOPHIL NFR BLD AUTO: 0 % (ref 0.3–6.2)
ERYTHROCYTE [DISTWIDTH] IN BLOOD BY AUTOMATED COUNT: 14.3 % (ref 12.3–15.4)
FERRITIN SERPL-MCNC: 1098 NG/ML (ref 30–400)
FIBRINOGEN PPP-MCNC: 590 MG/DL (ref 200–450)
GFR SERPL CREATININE-BSD FRML MDRD: 8 ML/MIN/1.73
GFR SERPL CREATININE-BSD FRML MDRD: ABNORMAL ML/MIN/{1.73_M2}
GLOBULIN UR ELPH-MCNC: 4.1 GM/DL
GLUCOSE BLDC GLUCOMTR-MCNC: 219 MG/DL (ref 70–99)
GLUCOSE BLDC GLUCOMTR-MCNC: 250 MG/DL (ref 70–99)
GLUCOSE BLDC GLUCOMTR-MCNC: 328 MG/DL (ref 70–99)
GLUCOSE BLDC GLUCOMTR-MCNC: 336 MG/DL (ref 70–99)
GLUCOSE SERPL-MCNC: 262 MG/DL (ref 65–99)
HBV SURFACE AG SERPL QL IA: NORMAL
HCT VFR BLD AUTO: 40.9 % (ref 37.5–51)
HGB BLD-MCNC: 13.1 G/DL (ref 13–17.7)
IMM GRANULOCYTES # BLD AUTO: 0.04 10*3/MM3 (ref 0–0.05)
IMM GRANULOCYTES NFR BLD AUTO: 0.8 % (ref 0–0.5)
INR PPP: 1.1 (ref 2–3)
L PNEUMO1 AG UR QL IA: NEGATIVE
LDH SERPL-CCNC: 282 U/L (ref 135–225)
LYMPHOCYTES # BLD AUTO: 0.58 10*3/MM3 (ref 0.7–3.1)
LYMPHOCYTES NFR BLD AUTO: 10.9 % (ref 19.6–45.3)
MCH RBC QN AUTO: 28.9 PG (ref 26.6–33)
MCHC RBC AUTO-ENTMCNC: 32 G/DL (ref 31.5–35.7)
MCV RBC AUTO: 90.1 FL (ref 79–97)
MONOCYTES # BLD AUTO: 0.34 10*3/MM3 (ref 0.1–0.9)
MONOCYTES NFR BLD AUTO: 6.4 % (ref 5–12)
NEUTROPHILS NFR BLD AUTO: 4.34 10*3/MM3 (ref 1.7–7)
NEUTROPHILS NFR BLD AUTO: 81.5 % (ref 42.7–76)
NRBC BLD AUTO-RTO: 0 /100 WBC (ref 0–0.2)
PHOSPHATE SERPL-MCNC: 3.3 MG/DL (ref 2.5–4.5)
PLATELET # BLD AUTO: 339 10*3/MM3 (ref 140–450)
PMV BLD AUTO: 10.6 FL (ref 6–12)
POTASSIUM SERPL-SCNC: 3.4 MMOL/L (ref 3.5–5.2)
PROCALCITONIN SERPL-MCNC: 0.34 NG/ML (ref 0–0.25)
PROT SERPL-MCNC: 7 G/DL (ref 6–8.5)
PROTHROMBIN TIME: 11.9 SECONDS (ref 9.4–12)
RBC # BLD AUTO: 4.54 10*6/MM3 (ref 4.14–5.8)
S PNEUM AG SPEC QL LA: NEGATIVE
SARS-COV-2 RNA RESP QL NAA+PROBE: DETECTED
SODIUM SERPL-SCNC: 135 MMOL/L (ref 136–145)
TROPONIN T SERPL-MCNC: 0.06 NG/ML (ref 0–0.03)
WBC # BLD AUTO: 5.32 10*3/MM3 (ref 3.4–10.8)

## 2021-08-24 PROCEDURE — 25010000002 HEPARIN (PORCINE) PER 1000 UNITS: Performed by: FAMILY MEDICINE

## 2021-08-24 PROCEDURE — 84100 ASSAY OF PHOSPHORUS: CPT | Performed by: FAMILY MEDICINE

## 2021-08-24 PROCEDURE — 82728 ASSAY OF FERRITIN: CPT | Performed by: FAMILY MEDICINE

## 2021-08-24 PROCEDURE — 84484 ASSAY OF TROPONIN QUANT: CPT | Performed by: FAMILY MEDICINE

## 2021-08-24 PROCEDURE — 87899 AGENT NOS ASSAY W/OPTIC: CPT | Performed by: FAMILY MEDICINE

## 2021-08-24 PROCEDURE — 25010000002 DEXAMETHASONE PER 1 MG: Performed by: FAMILY MEDICINE

## 2021-08-24 PROCEDURE — 85384 FIBRINOGEN ACTIVITY: CPT | Performed by: FAMILY MEDICINE

## 2021-08-24 PROCEDURE — 85025 COMPLETE CBC W/AUTO DIFF WBC: CPT | Performed by: FAMILY MEDICINE

## 2021-08-24 PROCEDURE — 94799 UNLISTED PULMONARY SVC/PX: CPT

## 2021-08-24 PROCEDURE — 84145 PROCALCITONIN (PCT): CPT | Performed by: FAMILY MEDICINE

## 2021-08-24 PROCEDURE — 83615 LACTATE (LD) (LDH) ENZYME: CPT | Performed by: FAMILY MEDICINE

## 2021-08-24 PROCEDURE — 99233 SBSQ HOSP IP/OBS HIGH 50: CPT | Performed by: FAMILY MEDICINE

## 2021-08-24 PROCEDURE — 85610 PROTHROMBIN TIME: CPT | Performed by: FAMILY MEDICINE

## 2021-08-24 PROCEDURE — 63710000001 INSULIN LISPRO (HUMAN) PER 5 UNITS: Performed by: FAMILY MEDICINE

## 2021-08-24 PROCEDURE — 87340 HEPATITIS B SURFACE AG IA: CPT | Performed by: INTERNAL MEDICINE

## 2021-08-24 PROCEDURE — 94760 N-INVAS EAR/PLS OXIMETRY 1: CPT

## 2021-08-24 PROCEDURE — 85379 FIBRIN DEGRADATION QUANT: CPT | Performed by: FAMILY MEDICINE

## 2021-08-24 PROCEDURE — 86140 C-REACTIVE PROTEIN: CPT | Performed by: FAMILY MEDICINE

## 2021-08-24 PROCEDURE — 63710000001 INSULIN DETEMIR PER 5 UNITS: Performed by: FAMILY MEDICINE

## 2021-08-24 PROCEDURE — 82962 GLUCOSE BLOOD TEST: CPT

## 2021-08-24 PROCEDURE — 80053 COMPREHEN METABOLIC PANEL: CPT | Performed by: FAMILY MEDICINE

## 2021-08-24 PROCEDURE — 25010000002 CEFTRIAXONE PER 250 MG: Performed by: FAMILY MEDICINE

## 2021-08-24 RX ADMIN — IPRATROPIUM BROMIDE AND ALBUTEROL SULFATE 3 ML: .5; 2.5 SOLUTION RESPIRATORY (INHALATION) at 19:35

## 2021-08-24 RX ADMIN — IPRATROPIUM BROMIDE AND ALBUTEROL SULFATE 3 ML: .5; 2.5 SOLUTION RESPIRATORY (INHALATION) at 07:22

## 2021-08-24 RX ADMIN — BUDESONIDE 0.5 MG: 0.5 INHALANT RESPIRATORY (INHALATION) at 07:21

## 2021-08-24 RX ADMIN — SODIUM CHLORIDE 1 G: 900 INJECTION INTRAVENOUS at 08:24

## 2021-08-24 RX ADMIN — INSULIN LISPRO 7 UNITS: 100 INJECTION, SOLUTION INTRAVENOUS; SUBCUTANEOUS at 17:32

## 2021-08-24 RX ADMIN — INSULIN LISPRO 4 UNITS: 100 INJECTION, SOLUTION INTRAVENOUS; SUBCUTANEOUS at 08:25

## 2021-08-24 RX ADMIN — INSULIN LISPRO 6 UNITS: 100 INJECTION, SOLUTION INTRAVENOUS; SUBCUTANEOUS at 12:41

## 2021-08-24 RX ADMIN — Medication: at 21:00

## 2021-08-24 RX ADMIN — HEPARIN SODIUM 5000 UNITS: 5000 INJECTION, SOLUTION INTRAVENOUS; SUBCUTANEOUS at 05:06

## 2021-08-24 RX ADMIN — ARFORMOTEROL TARTRATE 15 MCG: 15 SOLUTION RESPIRATORY (INHALATION) at 19:35

## 2021-08-24 RX ADMIN — IPRATROPIUM BROMIDE AND ALBUTEROL SULFATE 3 ML: .5; 2.5 SOLUTION RESPIRATORY (INHALATION) at 12:23

## 2021-08-24 RX ADMIN — HEPARIN SODIUM 5000 UNITS: 5000 INJECTION, SOLUTION INTRAVENOUS; SUBCUTANEOUS at 21:00

## 2021-08-24 RX ADMIN — DEXAMETHASONE SODIUM PHOSPHATE 6 MG: 10 INJECTION INTRAMUSCULAR; INTRAVENOUS at 08:25

## 2021-08-24 RX ADMIN — BUDESONIDE 0.5 MG: 0.5 INHALANT RESPIRATORY (INHALATION) at 19:35

## 2021-08-24 RX ADMIN — INSULIN DETEMIR 25 UNITS: 100 INJECTION, SOLUTION SUBCUTANEOUS at 21:02

## 2021-08-24 RX ADMIN — ARFORMOTEROL TARTRATE 15 MCG: 15 SOLUTION RESPIRATORY (INHALATION) at 07:21

## 2021-08-24 RX ADMIN — HEPARIN SODIUM 5000 UNITS: 5000 INJECTION, SOLUTION INTRAVENOUS; SUBCUTANEOUS at 14:20

## 2021-08-25 ENCOUNTER — TELEPHONE (OUTPATIENT)
Dept: FAMILY MEDICINE CLINIC | Facility: CLINIC | Age: 61
End: 2021-08-25

## 2021-08-25 ENCOUNTER — READMISSION MANAGEMENT (OUTPATIENT)
Dept: CALL CENTER | Facility: HOSPITAL | Age: 61
End: 2021-08-25

## 2021-08-25 VITALS
OXYGEN SATURATION: 95 % | HEART RATE: 89 BPM | SYSTOLIC BLOOD PRESSURE: 149 MMHG | WEIGHT: 278.88 LBS | DIASTOLIC BLOOD PRESSURE: 62 MMHG | HEIGHT: 72 IN | TEMPERATURE: 97.6 F | BODY MASS INDEX: 37.77 KG/M2 | RESPIRATION RATE: 18 BRPM

## 2021-08-25 PROBLEM — D89.832 CYTOKINE RELEASE SYNDROME, GRADE 2: Status: ACTIVE | Noted: 2021-08-25

## 2021-08-25 LAB
ALBUMIN SERPL-MCNC: 3.3 G/DL (ref 3.5–5.2)
ALBUMIN/GLOB SERPL: 1 G/DL
ALP SERPL-CCNC: 71 U/L (ref 39–117)
ALT SERPL W P-5'-P-CCNC: 30 U/L (ref 1–41)
ANION GAP SERPL CALCULATED.3IONS-SCNC: 14.9 MMOL/L (ref 5–15)
AST SERPL-CCNC: 20 U/L (ref 1–40)
BASOPHILS # BLD AUTO: 0.02 10*3/MM3 (ref 0–0.2)
BASOPHILS NFR BLD AUTO: 0.3 % (ref 0–1.5)
BILIRUB CONJ SERPL-MCNC: <0.2 MG/DL (ref 0–0.3)
BILIRUB SERPL-MCNC: 0.3 MG/DL (ref 0–1.2)
BUN SERPL-MCNC: 47 MG/DL (ref 6–20)
BUN/CREAT SERPL: 7.6 (ref 7–25)
CALCIUM SPEC-SCNC: 8.6 MG/DL (ref 8.6–10.5)
CHLORIDE SERPL-SCNC: 100 MMOL/L (ref 98–107)
CK SERPL-CCNC: 81 U/L (ref 20–200)
CO2 SERPL-SCNC: 24.1 MMOL/L (ref 22–29)
CREAT SERPL-MCNC: 6.18 MG/DL (ref 0.76–1.27)
CRP SERPL-MCNC: 2.49 MG/DL (ref 0–0.5)
DEPRECATED RDW RBC AUTO: 46 FL (ref 37–54)
EOSINOPHIL # BLD AUTO: 0.02 10*3/MM3 (ref 0–0.4)
EOSINOPHIL NFR BLD AUTO: 0.3 % (ref 0.3–6.2)
ERYTHROCYTE [DISTWIDTH] IN BLOOD BY AUTOMATED COUNT: 14.1 % (ref 12.3–15.4)
ERYTHROCYTE [SEDIMENTATION RATE] IN BLOOD: 90 MM/HR (ref 0–20)
FERRITIN SERPL-MCNC: 918.7 NG/ML (ref 30–400)
GFR SERPL CREATININE-BSD FRML MDRD: 9 ML/MIN/1.73
GFR SERPL CREATININE-BSD FRML MDRD: ABNORMAL ML/MIN/{1.73_M2}
GLOBULIN UR ELPH-MCNC: 3.4 GM/DL
GLUCOSE BLDC GLUCOMTR-MCNC: 214 MG/DL (ref 70–99)
GLUCOSE BLDC GLUCOMTR-MCNC: 248 MG/DL (ref 70–99)
GLUCOSE SERPL-MCNC: 280 MG/DL (ref 65–99)
HCT VFR BLD AUTO: 37.9 % (ref 37.5–51)
HGB BLD-MCNC: 12.2 G/DL (ref 13–17.7)
IMM GRANULOCYTES # BLD AUTO: 0.07 10*3/MM3 (ref 0–0.05)
IMM GRANULOCYTES NFR BLD AUTO: 0.9 % (ref 0–0.5)
LYMPHOCYTES # BLD AUTO: 0.76 10*3/MM3 (ref 0.7–3.1)
LYMPHOCYTES NFR BLD AUTO: 9.8 % (ref 19.6–45.3)
MAGNESIUM SERPL-MCNC: 2.1 MG/DL (ref 1.6–2.6)
MCH RBC QN AUTO: 28.9 PG (ref 26.6–33)
MCHC RBC AUTO-ENTMCNC: 32.2 G/DL (ref 31.5–35.7)
MCV RBC AUTO: 89.8 FL (ref 79–97)
MONOCYTES # BLD AUTO: 0.55 10*3/MM3 (ref 0.1–0.9)
MONOCYTES NFR BLD AUTO: 7.1 % (ref 5–12)
NEUTROPHILS NFR BLD AUTO: 6.34 10*3/MM3 (ref 1.7–7)
NEUTROPHILS NFR BLD AUTO: 81.6 % (ref 42.7–76)
NRBC BLD AUTO-RTO: 0 /100 WBC (ref 0–0.2)
PHOSPHATE SERPL-MCNC: 3.2 MG/DL (ref 2.5–4.5)
PLATELET # BLD AUTO: 339 10*3/MM3 (ref 140–450)
PMV BLD AUTO: 10.2 FL (ref 6–12)
POTASSIUM SERPL-SCNC: 3.3 MMOL/L (ref 3.5–5.2)
PROT SERPL-MCNC: 6.7 G/DL (ref 6–8.5)
RBC # BLD AUTO: 4.22 10*6/MM3 (ref 4.14–5.8)
SODIUM SERPL-SCNC: 139 MMOL/L (ref 136–145)
TROPONIN T SERPL-MCNC: 0.05 NG/ML (ref 0–0.03)
TROPONIN T SERPL-MCNC: 0.05 NG/ML (ref 0–0.03)
WBC # BLD AUTO: 7.76 10*3/MM3 (ref 3.4–10.8)

## 2021-08-25 PROCEDURE — 82962 GLUCOSE BLOOD TEST: CPT

## 2021-08-25 PROCEDURE — 84484 ASSAY OF TROPONIN QUANT: CPT | Performed by: FAMILY MEDICINE

## 2021-08-25 PROCEDURE — 99239 HOSP IP/OBS DSCHRG MGMT >30: CPT | Performed by: FAMILY MEDICINE

## 2021-08-25 PROCEDURE — 80053 COMPREHEN METABOLIC PANEL: CPT | Performed by: FAMILY MEDICINE

## 2021-08-25 PROCEDURE — 86140 C-REACTIVE PROTEIN: CPT | Performed by: FAMILY MEDICINE

## 2021-08-25 PROCEDURE — 82248 BILIRUBIN DIRECT: CPT | Performed by: FAMILY MEDICINE

## 2021-08-25 PROCEDURE — 82550 ASSAY OF CK (CPK): CPT | Performed by: FAMILY MEDICINE

## 2021-08-25 PROCEDURE — 63710000001 INSULIN DETEMIR PER 5 UNITS: Performed by: FAMILY MEDICINE

## 2021-08-25 PROCEDURE — 94799 UNLISTED PULMONARY SVC/PX: CPT

## 2021-08-25 PROCEDURE — 84100 ASSAY OF PHOSPHORUS: CPT | Performed by: FAMILY MEDICINE

## 2021-08-25 PROCEDURE — 85025 COMPLETE CBC W/AUTO DIFF WBC: CPT | Performed by: FAMILY MEDICINE

## 2021-08-25 PROCEDURE — 82728 ASSAY OF FERRITIN: CPT | Performed by: FAMILY MEDICINE

## 2021-08-25 PROCEDURE — 25010000002 HEPARIN (PORCINE) PER 1000 UNITS: Performed by: FAMILY MEDICINE

## 2021-08-25 PROCEDURE — 25010000002 DEXAMETHASONE PER 1 MG: Performed by: FAMILY MEDICINE

## 2021-08-25 PROCEDURE — 85652 RBC SED RATE AUTOMATED: CPT | Performed by: FAMILY MEDICINE

## 2021-08-25 PROCEDURE — 83735 ASSAY OF MAGNESIUM: CPT | Performed by: FAMILY MEDICINE

## 2021-08-25 PROCEDURE — 25010000002 CEFTRIAXONE PER 250 MG: Performed by: FAMILY MEDICINE

## 2021-08-25 PROCEDURE — 94760 N-INVAS EAR/PLS OXIMETRY 1: CPT

## 2021-08-25 PROCEDURE — 63710000001 INSULIN LISPRO (HUMAN) PER 5 UNITS: Performed by: FAMILY MEDICINE

## 2021-08-25 RX ORDER — ALBUMIN (HUMAN) 12.5 G/50ML
12.5 SOLUTION INTRAVENOUS AS NEEDED
Status: DISCONTINUED | OUTPATIENT
Start: 2021-08-25 | End: 2021-08-25 | Stop reason: HOSPADM

## 2021-08-25 RX ORDER — DEXAMETHASONE 4 MG/1
4 TABLET ORAL 2 TIMES DAILY WITH MEALS
Qty: 14 TABLET | Refills: 0 | Status: SHIPPED | OUTPATIENT
Start: 2021-08-25 | End: 2021-09-01

## 2021-08-25 RX ADMIN — HEPARIN SODIUM 5000 UNITS: 5000 INJECTION, SOLUTION INTRAVENOUS; SUBCUTANEOUS at 13:23

## 2021-08-25 RX ADMIN — DEXAMETHASONE SODIUM PHOSPHATE 6 MG: 10 INJECTION INTRAMUSCULAR; INTRAVENOUS at 08:38

## 2021-08-25 RX ADMIN — IPRATROPIUM BROMIDE AND ALBUTEROL SULFATE 3 ML: .5; 2.5 SOLUTION RESPIRATORY (INHALATION) at 00:45

## 2021-08-25 RX ADMIN — INSULIN LISPRO 4 UNITS: 100 INJECTION, SOLUTION INTRAVENOUS; SUBCUTANEOUS at 13:22

## 2021-08-25 RX ADMIN — SODIUM CHLORIDE 1 G: 900 INJECTION INTRAVENOUS at 08:38

## 2021-08-25 RX ADMIN — IPRATROPIUM BROMIDE AND ALBUTEROL SULFATE 3 ML: .5; 2.5 SOLUTION RESPIRATORY (INHALATION) at 06:28

## 2021-08-25 RX ADMIN — IPRATROPIUM BROMIDE AND ALBUTEROL SULFATE 3 ML: .5; 2.5 SOLUTION RESPIRATORY (INHALATION) at 12:02

## 2021-08-25 RX ADMIN — INSULIN DETEMIR 15 UNITS: 100 INJECTION, SOLUTION SUBCUTANEOUS at 13:25

## 2021-08-25 RX ADMIN — ARFORMOTEROL TARTRATE 15 MCG: 15 SOLUTION RESPIRATORY (INHALATION) at 06:39

## 2021-08-25 RX ADMIN — INSULIN LISPRO 4 UNITS: 100 INJECTION, SOLUTION INTRAVENOUS; SUBCUTANEOUS at 08:38

## 2021-08-25 RX ADMIN — HEPARIN SODIUM 5000 UNITS: 5000 INJECTION, SOLUTION INTRAVENOUS; SUBCUTANEOUS at 05:22

## 2021-08-25 RX ADMIN — BUDESONIDE 0.5 MG: 0.5 INHALANT RESPIRATORY (INHALATION) at 06:34

## 2021-08-25 NOTE — TELEPHONE ENCOUNTER
Caller: [facility]    Relationship to patient: Other    New or established patient?  [] New  [x] Established    Date of discharge: 8/25/2021    Facility discharged from: [facility]    Diagnosis/Symptoms: COVID POSITIVE

## 2021-08-25 NOTE — OUTREACH NOTE
Prep Survey      Responses   [facility] patient discharged from?  [facility]   Is LACE score < 7 ?  No   Emergency Room discharge w/ pulse ox?  No   Eligibility  [facility]   Hospital  [facility]   Date of Admission  08/23/21   Date of Discharge  08/25/21   Discharge Disposition  Home or Self Care   Discharge diagnosis  Covid 19 PNA   Does the patient have one of the following disease processes/diagnoses(primary or secondary)?  COVID-19   Does the patient have Home health ordered?  No   Is there a DME ordered?  No   Prep survey completed?  Yes          Calli Weir RN

## 2021-08-26 ENCOUNTER — TRANSITIONAL CARE MANAGEMENT TELEPHONE ENCOUNTER (OUTPATIENT)
Dept: CALL CENTER | Facility: HOSPITAL | Age: 61
End: 2021-08-26

## 2021-08-26 LAB
BACTERIA SPEC RESP CULT: ABNORMAL
BACTERIA SPEC RESP CULT: ABNORMAL
GRAM STN SPEC: ABNORMAL
GRAM STN SPEC: ABNORMAL

## 2021-08-26 RX ORDER — LEVOFLOXACIN 750 MG/1
750 TABLET ORAL ONCE
Qty: 1 TABLET | Refills: 0 | Status: SHIPPED | OUTPATIENT
Start: 2021-08-26 | End: 2021-08-26

## 2021-08-26 RX ORDER — LEVOFLOXACIN 500 MG/1
500 TABLET, FILM COATED ORAL EVERY OTHER DAY
Qty: 5 TABLET | Refills: 0 | Status: SHIPPED | OUTPATIENT
Start: 2021-08-28 | End: 2021-09-07

## 2021-08-26 NOTE — OUTREACH NOTE
Call Center TCM Note      Responses   [facility] patient discharged from?  [facility]   Does the patient have one of the following disease processes/diagnoses(primary or secondary)?  COVID-19   COVID-19 underlying condition?  None   TCM attempt successful?  Yes   Call start time  1253   Call end time  1257   Discharge diagnosis  Covid 19 PNA   Meds reviewed with patient/caregiver?  Yes   Is the patient having any side effects they believe may be caused by any medication additions or changes?  No   Does the patient have all medications ordered at discharge?  Yes   Does the patient have a primary care provider?   Yes   Comments regarding PCP  Patient has a followup scheduled on 9/1/2021 with YISEL Cota    Does the patient have an appointment with their PCP or specialist within 7 days of discharge?  Yes   Has the patient kept scheduled appointments due by today?  N/A   Has home health visited the patient within 72 hours of discharge?  N/A   Psychosocial issues?  No   Comments  Patient does not have a pulse ox or 02.    Did the patient receive a copy of their discharge instructions?  Yes   Did the patient receive a copy of COVID-19 specific instructions?  Yes   Nursing interventions  Reviewed instructions with patient   What is the patient's perception of their health status since discharge?  Improving   Does the patient have any of the following symptoms?  Cough   Nursing Interventions  Nurse provided patient education   Pulse Ox monitoring  None   Is the patient/caregiver able to teach back steps to recovery at home?  Set small, achievable goals for return to baseline health, Rest and rebuild strength, gradually increase activity, Make a list of questions for provider's appointment   If the patient is a current smoker, are they able to teach back resources for cessation?  Smoking cessation medications   Is the patient/caregiver able to teach back the hierarchy of who to call/visit for symptoms/problems?  PCP, Specialist, Home health nurse, Urgent Care, ED, 911  Yes   TCM call completed?  Yes          Dinesh Dunaway RN    8/26/2021, 12:57 EDT

## 2021-08-27 ENCOUNTER — READMISSION MANAGEMENT (OUTPATIENT)
Dept: CALL CENTER | Facility: HOSPITAL | Age: 61
End: 2021-08-27

## 2021-08-27 NOTE — OUTREACH NOTE
COVID-19 Week 1 Survey      Responses   [facility] patient discharged from?  [facility]   Does the patient have one of the following disease processes/diagnoses(primary or secondary)?  COVID-19   COVID-19 underlying condition?  None   Call Number  Call 2   Week 1 Call successful?  No   Discharge diagnosis  Covid 19 PNA          Abril Sol RN

## 2021-08-28 ENCOUNTER — READMISSION MANAGEMENT (OUTPATIENT)
Dept: CALL CENTER | Facility: HOSPITAL | Age: 61
End: 2021-08-28

## 2021-08-28 LAB
BACTERIA SPEC AEROBE CULT: NORMAL
BACTERIA SPEC AEROBE CULT: NORMAL

## 2021-08-28 NOTE — OUTREACH NOTE
COVID-19 Week 1 Survey      Responses   [facility] patient discharged from?  [facility]   Does the patient have one of the following disease processes/diagnoses(primary or secondary)?  COVID-19   COVID-19 underlying condition?  None   Call Number  Call 3   Week 1 Call successful?  No   Discharge diagnosis  Covid 19 PNA          Tracey Benitez RN

## 2021-08-29 PROBLEM — I25.10 CORONARY ARTERY DISEASE INVOLVING NATIVE CORONARY ARTERY OF NATIVE HEART WITHOUT ANGINA PECTORIS: Status: ACTIVE | Noted: 2021-08-29

## 2021-08-29 PROBLEM — Z95.5 HISTORY OF CORONARY ANGIOPLASTY WITH INSERTION OF STENT: Status: ACTIVE | Noted: 2021-08-29

## 2021-08-29 PROBLEM — E78.2 HYPERLIPEMIA, MIXED: Status: ACTIVE | Noted: 2021-08-29

## 2021-08-29 PROBLEM — Z95.1 HX OF CABG: Status: ACTIVE | Noted: 2021-08-29

## 2021-08-29 PROBLEM — Z72.0 NICOTINE USE: Status: ACTIVE | Noted: 2021-08-29

## 2021-08-31 ENCOUNTER — READMISSION MANAGEMENT (OUTPATIENT)
Dept: CALL CENTER | Facility: HOSPITAL | Age: 61
End: 2021-08-31

## 2021-08-31 DIAGNOSIS — I10 ESSENTIAL HYPERTENSION: ICD-10-CM

## 2021-08-31 NOTE — OUTREACH NOTE
COVID-19 Week 1 Survey      Responses   [facility] patient discharged from?  [facility]   Does the patient have one of the following disease processes/diagnoses(primary or secondary)?  COVID-19   COVID-19 underlying condition?  None   Call Number  Call 4   Week 1 Call successful?  No   Discharge diagnosis  Covid 19 PNA          Nadia Nolen LPN

## 2021-09-02 RX ORDER — BUMETANIDE 2 MG/1
TABLET ORAL
Qty: 60 TABLET | Refills: 0 | Status: SHIPPED | OUTPATIENT
Start: 2021-09-02 | End: 2022-03-01 | Stop reason: HOSPADM

## 2021-09-03 ENCOUNTER — READMISSION MANAGEMENT (OUTPATIENT)
Dept: CALL CENTER | Facility: HOSPITAL | Age: 61
End: 2021-09-03

## 2021-09-03 NOTE — OUTREACH NOTE
COVID-19 Week 2 Survey      Responses   [facility] patient discharged from?  [facility]   Does the patient have one of the following disease processes/diagnoses(primary or secondary)?  COVID-19   COVID-19 underlying condition?  None   Call Number  Call 1   COVID-19 Week 2: Call 1 attempt successful?  Yes   Call start time  0949   Call end time  1014   Discharge diagnosis  Covid 19 PNA   Meds reviewed with patient/caregiver?  Yes   Is the patient having any side effects they believe may be caused by any medication additions or changes?  No   Does the patient have all medications ordered at discharge?  No   What is keeping the patient from filling the prescriptions?  Doesn't understand reason for taking, Script on hold per patient [pt uses different pharmacy than [facility] where script was filled, decided not to pick, does not see need to fill]   Nursing Interventions  Nurse provided patient education, Nurse advised patient to call provider [advised pt to notify PCP that he did not fill Decadron script, and doesn't plan to take]   Prescription comments  pt non-compliant with taking Decadron   Is the patient taking all medications as directed (includes completed medication regime)?  No   What is preventing the patient from taking all medications as directed?  Doesn't understand importance   Nursing Interventions  Nurse provided patient education, Advised patient to call provider   Does the patient have a primary care provider?   Yes   Does the patient have an appointment with their PCP or specialist within 7 days of discharge?  Yes   Has the patient kept scheduled appointments due by today?  No [forgot appt , will go to next scheduled appt on 9/27/21]   What is preventing the patient from keeping their appointments?  -- [pt forgot appt]   Nursing Interventions  Advised patient to keep appointment   Has home health visited the patient within 72 hours of discharge?  N/A   Psychosocial issues?  No   Did the patient  receive a copy of their discharge instructions?  Yes   Did the patient receive a copy of COVID-19 specific instructions?  Yes   Nursing interventions  Reviewed instructions with patient   What is the patient's perception of their health status since discharge?  Improving   Does the patient have any of the following symptoms?  None   Nursing Interventions  Nurse provided patient education   Pulse Ox monitoring  None   Is the patient/caregiver able to teach back steps to recovery at home?  Set small, achievable goals for return to baseline health   Is the patient/caregiver able to teach back the hierarchy of who to call/visit for symptoms/problems? PCP, Specialist, Home health nurse, Urgent Care, ED, 911  Yes [pt states did not feel any effects of COVID virus, did not wish to comply with prescribed Decadron, in addition, pt has been refusing to go to dialysis, states feels fine and does not need to go, MD's aware]   COVID-19 call completed?  Yes   Wrap up additional comments  pt continues pattern of non-compliance          Liliana Rosa, RN

## 2021-09-10 ENCOUNTER — READMISSION MANAGEMENT (OUTPATIENT)
Dept: CALL CENTER | Facility: HOSPITAL | Age: 61
End: 2021-09-10

## 2021-09-10 NOTE — OUTREACH NOTE
COVID-19 Week 3 Survey      Responses   [facility] patient discharged from?  [facility]   Does the patient have one of the following disease processes/diagnoses(primary or secondary)?  COVID-19   COVID-19 underlying condition?  None   Call Number  Call 1   COVID-19 Week 3: Call 1 attempt successful?  No          Nadia Nolen LPN

## 2021-09-17 ENCOUNTER — READMISSION MANAGEMENT (OUTPATIENT)
Dept: CALL CENTER | Facility: HOSPITAL | Age: 61
End: 2021-09-17

## 2021-09-17 NOTE — OUTREACH NOTE
COVID-19 Week 4 Survey      Responses   [facility] patient discharged from?  [facility]   Does the patient have one of the following disease processes/diagnoses(primary or secondary)?  COVID-19   COVID-19 underlying condition?  None   Call Number  Call 1   COVID-19 Week 4: Call 1 attempt successful?  No   Discharge diagnosis  Covid 19 PNA          Olga Jacques RN

## 2021-09-17 NOTE — ANESTHESIA POSTPROCEDURE EVALUATION
Patient: Min Trejo    Procedure Summary     Date:  02/01/19 Room / Location:  [facility] OR [address] [place] [address] [facility]    Anesthesia Start:  0836 Anesthesia Stop:  1020    Procedure:  left arm fistula formation (Left ) Diagnosis:      Surgeon:  Daniel Solis MD Provider:  Yaima Davis MD    Anesthesia Type:  MAC ASA Status:  3          Anesthesia Type: MAC  Last vitals  BP   157/92 (02/01/19 1025)   Temp   36.5 °C (97.7 °F) (02/01/19 1017)   Pulse   65 (02/01/19 1025)   Resp   16 (02/01/19 1025)     SpO2   98 % (02/01/19 1025)     Post Anesthesia Care and Evaluation    Patient location during evaluation: PACU  Patient participation: complete - patient participated  Level of consciousness: awake  Pain score: 1  Pain management: adequate  Airway patency: patent  Anesthetic complications: No anesthetic complications  PONV Status: none  Cardiovascular status: acceptable  Respiratory status: acceptable  Hydration status: acceptable      
no

## 2021-11-01 PROBLEM — I25.2 HISTORY OF HEART ATTACK: Status: ACTIVE | Noted: 2021-11-01

## 2021-11-01 PROBLEM — I73.9 PVD (PERIPHERAL VASCULAR DISEASE): Status: ACTIVE | Noted: 2021-06-30

## 2021-11-01 PROBLEM — R06.02 SHORTNESS OF BREATH: Status: ACTIVE | Noted: 2019-11-01

## 2021-11-01 PROBLEM — Z23 ENCOUNTER FOR IMMUNIZATION: Status: ACTIVE | Noted: 2019-11-06

## 2021-11-01 PROBLEM — F17.200 NICOTINE ADDICTION: Status: ACTIVE | Noted: 2021-11-01

## 2021-11-01 PROBLEM — N18.9 ANEMIA IN CHRONIC KIDNEY DISEASE: Status: ACTIVE | Noted: 2019-11-01

## 2021-11-01 PROBLEM — E83.39 OTHER DISORDERS OF PHOSPHORUS METABOLISM: Status: ACTIVE | Noted: 2020-04-16

## 2021-11-01 PROBLEM — N25.81 SECONDARY HYPERPARATHYROIDISM OF RENAL ORIGIN: Status: ACTIVE | Noted: 2019-11-01

## 2021-11-01 PROBLEM — M19.90 ARTHRITIS: Status: ACTIVE | Noted: 2021-11-01

## 2021-11-01 PROBLEM — N18.6 ESRD (END STAGE RENAL DISEASE) (HCC): Status: ACTIVE | Noted: 2019-11-01

## 2021-11-01 PROBLEM — D63.1 ANEMIA IN CHRONIC KIDNEY DISEASE: Status: ACTIVE | Noted: 2019-11-01

## 2021-11-01 PROBLEM — K21.9 GASTRO-ESOPHAGEAL REFLUX DISEASE WITHOUT ESOPHAGITIS: Status: ACTIVE | Noted: 2019-11-01

## 2021-11-01 PROBLEM — S31.809A OPEN WOUND OF BUTTOCK: Status: ACTIVE | Noted: 2021-11-01

## 2021-11-01 PROBLEM — Z11.1 ENCOUNTER FOR SCREENING FOR RESPIRATORY TUBERCULOSIS: Status: ACTIVE | Noted: 2019-11-01

## 2021-11-01 PROBLEM — A49.02 INFECTION WITH METHICILLIN-RESISTANT STAPHYLOCOCCUS AUREUS: Status: ACTIVE | Noted: 2021-11-01

## 2021-11-01 PROBLEM — D50.9 IRON DEFICIENCY ANEMIA: Status: ACTIVE | Noted: 2019-11-01

## 2021-11-01 PROBLEM — M51.36 BULGE OF LUMBAR DISC WITHOUT MYELOPATHY: Status: ACTIVE | Noted: 2021-11-01

## 2021-11-01 PROBLEM — E46 UNSPECIFIED PROTEIN-CALORIE MALNUTRITION (HCC): Status: ACTIVE | Noted: 2019-12-02

## 2021-11-01 PROBLEM — R09.89 ABNORMAL FOOT PULSE: Status: ACTIVE | Noted: 2021-11-01

## 2021-11-01 PROBLEM — M54.16 LUMBAR RADICULOPATHY: Status: ACTIVE | Noted: 2021-11-01

## 2021-11-01 PROBLEM — N19 RENAL FAILURE: Status: ACTIVE | Noted: 2021-11-01

## 2021-11-01 PROBLEM — Z99.2 DEPENDENCE ON RENAL DIALYSIS (HCC): Status: ACTIVE | Noted: 2019-11-01

## 2021-11-01 PROBLEM — D68.9 COAGULATION DEFECT, UNSPECIFIED: Status: ACTIVE | Noted: 2019-11-01

## 2021-11-01 PROBLEM — R94.2 ABNORMAL PFTS (PULMONARY FUNCTION TESTS): Status: ACTIVE | Noted: 2021-06-30

## 2021-11-01 PROBLEM — H35.00 RETINOPATHY: Status: ACTIVE | Noted: 2017-04-12

## 2021-11-01 PROBLEM — T78.40XA ALLERGY, UNSPECIFIED, INITIAL ENCOUNTER: Status: ACTIVE | Noted: 2020-09-18

## 2021-11-01 PROBLEM — U07.1 COVID-19: Status: ACTIVE | Noted: 2021-08-11

## 2021-11-01 PROBLEM — E66.01 CLASS 2 SEVERE OBESITY DUE TO EXCESS CALORIES WITH SERIOUS COMORBIDITY AND BODY MASS INDEX (BMI) OF 39.0 TO 39.9 IN ADULT (HCC): Status: ACTIVE | Noted: 2021-06-30

## 2021-11-01 PROBLEM — R29.898 LEFT LEG WEAKNESS: Status: ACTIVE | Noted: 2021-11-01

## 2021-11-01 PROBLEM — M79.89 LIMB SWELLING: Status: ACTIVE | Noted: 2021-11-01

## 2021-12-06 ENCOUNTER — TELEPHONE (OUTPATIENT)
Dept: FAMILY MEDICINE CLINIC | Facility: CLINIC | Age: 61
End: 2021-12-06
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
## 2021-12-06 NOTE — TELEPHONE ENCOUNTER
Unfortunately this person does not appear to be on the patient's medical release, meaning we cannot give this information to the caller, however the treating hospital may contact us and we will be able to give this information to them.  Any information about his dialysis will have to come from his treating physician (nephrologist), as we do not have any of that information.

## 2022-01-27 ENCOUNTER — TELEPHONE (OUTPATIENT)
Dept: FAMILY MEDICINE CLINIC | Facility: CLINIC | Age: 62
End: 2022-01-27

## 2022-01-27 NOTE — TELEPHONE ENCOUNTER
I cannot complete [facility] paperwork as I have not seen this patient in almost 8 months. Patient would require office visit to document what his medical condition is and his need for care from another individual in order to complete.

## 2022-01-27 NOTE — TELEPHONE ENCOUNTER
Legally there is nothing I can do at this point, I cannot complete FMLA on the patient or condition that I have not seen and evaluated.  Recommend that patient attempt to contact what ever provider has been caring for his father during this time to see if they can complete the FMLA

## 2022-01-27 NOTE — TELEPHONE ENCOUNTER
Caller: THELMA TAYLOR     Relationship to patient: Child    Best call back number: [phone]    Patient is needing: PATIENTS SON CALLED STATING HE IS NEEDING TO KNOW IF THE FMLA PAPER WORK HAS BEEN COMPLETED OR NOT BY THE PATIENTS PCP. THE PATIENTS SON STATED THE PATIENT IS COMING HOME ON Saturday AND IS NEEDING THIS FMLA PAPER WORK TO BE COMPLETED SO HE CAN STAY HOME WITH HIM AND TAKE CARE OF THE PATIENT. THE PATIENTS SON WOULD LIKE A CALL BACK PLEASE ADVISE THANK YOU.

## 2022-01-27 NOTE — TELEPHONE ENCOUNTER
Spoke with patients son and he says patient cannot walk. He says it takes three of them to get the patient up. I asked about a telehealth and they do not have Giving Assistant, google duo, or a computer to do mychart. What do you suggest they do?

## 2022-02-16 ENCOUNTER — APPOINTMENT (OUTPATIENT)
Dept: MRI IMAGING | Facility: HOSPITAL | Age: 62
End: 2022-02-16

## 2022-02-16 ENCOUNTER — APPOINTMENT (OUTPATIENT)
Dept: GENERAL RADIOLOGY | Facility: HOSPITAL | Age: 62
End: 2022-02-16

## 2022-02-16 ENCOUNTER — APPOINTMENT (OUTPATIENT)
Dept: CT IMAGING | Facility: HOSPITAL | Age: 62
End: 2022-02-16

## 2022-02-16 ENCOUNTER — HOSPITAL ENCOUNTER (INPATIENT)
Facility: HOSPITAL | Age: 62
LOS: 13 days | Discharge: NURSING, REHAB OR LONG-TERM CARE | End: 2022-03-01
Attending: EMERGENCY MEDICINE | Admitting: STUDENT IN AN ORGANIZED HEALTH CARE EDUCATION/TRAINING PROGRAM

## 2022-02-16 DIAGNOSIS — R26.2 DIFFICULTY WALKING: ICD-10-CM

## 2022-02-16 DIAGNOSIS — Z78.9 DECREASED ACTIVITIES OF DAILY LIVING (ADL): ICD-10-CM

## 2022-02-16 DIAGNOSIS — R41.82 ALTERED MENTAL STATUS, UNSPECIFIED ALTERED MENTAL STATUS TYPE: Primary | ICD-10-CM

## 2022-02-16 LAB
ALBUMIN SERPL-MCNC: 3.2 G/DL (ref 3.5–5.2)
ALBUMIN/GLOB SERPL: 1.1 G/DL
ALP SERPL-CCNC: 107 U/L (ref 39–117)
ALT SERPL W P-5'-P-CCNC: 16 U/L (ref 1–41)
AMORPH URATE CRY URNS QL MICRO: ABNORMAL /HPF
ANION GAP SERPL CALCULATED.3IONS-SCNC: 16 MMOL/L (ref 5–15)
ARTERIAL PATENCY WRIST A: POSITIVE
AST SERPL-CCNC: 17 U/L (ref 1–40)
BACTERIA UR QL AUTO: ABNORMAL /HPF
BASE EXCESS BLDA CALC-SCNC: -3.4 MMOL/L (ref -2–2)
BASOPHILS # BLD AUTO: 0.05 10*3/MM3 (ref 0–0.2)
BASOPHILS NFR BLD AUTO: 0.6 % (ref 0–1.5)
BDY SITE: ABNORMAL
BILIRUB SERPL-MCNC: 0.3 MG/DL (ref 0–1.2)
BILIRUB UR QL STRIP: ABNORMAL
BUN SERPL-MCNC: 41 MG/DL (ref 6–20)
BUN/CREAT SERPL: 5.3 (ref 7–25)
CALCIUM SPEC-SCNC: 8.3 MG/DL (ref 8.6–10.5)
CHLORIDE SERPL-SCNC: 103 MMOL/L (ref 98–107)
CLARITY UR: ABNORMAL
CO2 SERPL-SCNC: 23 MMOL/L (ref 22–29)
COHGB MFR BLD: 0.6 % (ref 0–1.5)
COLOR UR: ABNORMAL
CREAT SERPL-MCNC: 7.71 MG/DL (ref 0.76–1.27)
D-LACTATE SERPL-SCNC: 1.4 MMOL/L (ref 0.5–2)
D-LACTATE SERPL-SCNC: 3.3 MMOL/L (ref 0.5–2)
DEPRECATED RDW RBC AUTO: 68.7 FL (ref 37–54)
EOSINOPHIL # BLD AUTO: 0.02 10*3/MM3 (ref 0–0.4)
EOSINOPHIL NFR BLD AUTO: 0.3 % (ref 0.3–6.2)
ERYTHROCYTE [DISTWIDTH] IN BLOOD BY AUTOMATED COUNT: 17.7 % (ref 12.3–15.4)
FHHB: 1.4 % (ref 0–5)
GAS FLOW AIRWAY: 2 LPM
GFR SERPL CREATININE-BSD FRML MDRD: 7 ML/MIN/1.73
GLOBULIN UR ELPH-MCNC: 2.8 GM/DL
GLUCOSE BLDC GLUCOMTR-MCNC: 100 MG/DL (ref 70–99)
GLUCOSE BLDC GLUCOMTR-MCNC: 188 MG/DL (ref 70–99)
GLUCOSE SERPL-MCNC: 209 MG/DL (ref 65–99)
GLUCOSE UR STRIP-MCNC: ABNORMAL MG/DL
HCO3 BLDA-SCNC: 24.3 MMOL/L (ref 22–26)
HCT VFR BLD AUTO: 33.5 % (ref 37.5–51)
HGB BLD-MCNC: 9.5 G/DL (ref 13–17.7)
HGB BLDA-MCNC: 10.4 G/DL (ref 13.8–16.4)
HGB UR QL STRIP.AUTO: ABNORMAL
HOLD SPECIMEN: NORMAL
HOLD SPECIMEN: NORMAL
HYALINE CASTS UR QL AUTO: ABNORMAL /LPF
IMM GRANULOCYTES # BLD AUTO: 0.09 10*3/MM3 (ref 0–0.05)
IMM GRANULOCYTES NFR BLD AUTO: 1.1 % (ref 0–0.5)
INHALED O2 CONCENTRATION: 28 %
KETONES UR QL STRIP: NEGATIVE
LACTATE BLDA-SCNC: ABNORMAL MMOL/L
LEUKOCYTE ESTERASE UR QL STRIP.AUTO: ABNORMAL
LYMPHOCYTES # BLD AUTO: 0.67 10*3/MM3 (ref 0.7–3.1)
LYMPHOCYTES NFR BLD AUTO: 8.5 % (ref 19.6–45.3)
MAGNESIUM SERPL-MCNC: 2.5 MG/DL (ref 1.6–2.6)
MCH RBC QN AUTO: 29.8 PG (ref 26.6–33)
MCHC RBC AUTO-ENTMCNC: 28.4 G/DL (ref 31.5–35.7)
MCV RBC AUTO: 105 FL (ref 79–97)
METHGB BLD QL: 0.3 % (ref 0–1.5)
MODALITY: ABNORMAL
MONOCYTES # BLD AUTO: 0.6 10*3/MM3 (ref 0.1–0.9)
MONOCYTES NFR BLD AUTO: 7.7 % (ref 5–12)
NEUTROPHILS NFR BLD AUTO: 6.41 10*3/MM3 (ref 1.7–7)
NEUTROPHILS NFR BLD AUTO: 81.8 % (ref 42.7–76)
NITRITE UR QL STRIP: NEGATIVE
NRBC BLD AUTO-RTO: 1.5 /100 WBC (ref 0–0.2)
OXYHGB MFR BLDV: 97.7 % (ref 94–99)
PCO2 BLDA: 57.1 MM HG (ref 35–45)
PH BLDA: 7.25 PH UNITS (ref 7.35–7.45)
PH UR STRIP.AUTO: <=5 [PH] (ref 5–8)
PLATELET # BLD AUTO: 266 10*3/MM3 (ref 140–450)
PMV BLD AUTO: 10 FL (ref 6–12)
PO2 BLD: 556 MM[HG] (ref 0–500)
PO2 BLDA: 155.7 MM HG (ref 80–100)
POTASSIUM SERPL-SCNC: 3.6 MMOL/L (ref 3.5–5.2)
PROT SERPL-MCNC: 6 G/DL (ref 6–8.5)
PROT UR QL STRIP: ABNORMAL
QT INTERVAL: 380 MS
RBC # BLD AUTO: 3.19 10*6/MM3 (ref 4.14–5.8)
RBC # UR STRIP: ABNORMAL /HPF
REF LAB TEST METHOD: ABNORMAL
SAO2 % BLDCOA: 98.6 % (ref 95–99)
SARS-COV-2 RNA PNL SPEC NAA+PROBE: NOT DETECTED
SODIUM SERPL-SCNC: 142 MMOL/L (ref 136–145)
SP GR UR STRIP: 1.03 (ref 1–1.03)
SQUAMOUS #/AREA URNS HPF: ABNORMAL /HPF
TROPONIN T SERPL-MCNC: 0.67 NG/ML (ref 0–0.03)
UROBILINOGEN UR QL STRIP: ABNORMAL
WBC # UR STRIP: ABNORMAL /HPF
WBC NRBC COR # BLD: 7.84 10*3/MM3 (ref 3.4–10.8)
WHOLE BLOOD HOLD SPECIMEN: NORMAL
WHOLE BLOOD HOLD SPECIMEN: NORMAL

## 2022-02-16 PROCEDURE — 84484 ASSAY OF TROPONIN QUANT: CPT | Performed by: EMERGENCY MEDICINE

## 2022-02-16 PROCEDURE — 83050 HGB METHEMOGLOBIN QUAN: CPT | Performed by: EMERGENCY MEDICINE

## 2022-02-16 PROCEDURE — 87040 BLOOD CULTURE FOR BACTERIA: CPT | Performed by: EMERGENCY MEDICINE

## 2022-02-16 PROCEDURE — 94660 CPAP INITIATION&MGMT: CPT

## 2022-02-16 PROCEDURE — 82805 BLOOD GASES W/O2 SATURATION: CPT | Performed by: EMERGENCY MEDICINE

## 2022-02-16 PROCEDURE — 70450 CT HEAD/BRAIN W/O DYE: CPT

## 2022-02-16 PROCEDURE — 82962 GLUCOSE BLOOD TEST: CPT

## 2022-02-16 PROCEDURE — 63710000001 INSULIN DETEMIR PER 5 UNITS: Performed by: INTERNAL MEDICINE

## 2022-02-16 PROCEDURE — 87186 SC STD MICRODIL/AGAR DIL: CPT | Performed by: EMERGENCY MEDICINE

## 2022-02-16 PROCEDURE — 71045 X-RAY EXAM CHEST 1 VIEW: CPT

## 2022-02-16 PROCEDURE — 80053 COMPREHEN METABOLIC PANEL: CPT | Performed by: EMERGENCY MEDICINE

## 2022-02-16 PROCEDURE — 99223 1ST HOSP IP/OBS HIGH 75: CPT | Performed by: INTERNAL MEDICINE

## 2022-02-16 PROCEDURE — 93010 ELECTROCARDIOGRAM REPORT: CPT | Performed by: INTERNAL MEDICINE

## 2022-02-16 PROCEDURE — 36600 WITHDRAWAL OF ARTERIAL BLOOD: CPT | Performed by: EMERGENCY MEDICINE

## 2022-02-16 PROCEDURE — 93005 ELECTROCARDIOGRAM TRACING: CPT | Performed by: EMERGENCY MEDICINE

## 2022-02-16 PROCEDURE — 85025 COMPLETE CBC W/AUTO DIFF WBC: CPT | Performed by: EMERGENCY MEDICINE

## 2022-02-16 PROCEDURE — 25010000002 HEPARIN (PORCINE) PER 1000 UNITS: Performed by: INTERNAL MEDICINE

## 2022-02-16 PROCEDURE — 83605 ASSAY OF LACTIC ACID: CPT | Performed by: EMERGENCY MEDICINE

## 2022-02-16 PROCEDURE — 82375 ASSAY CARBOXYHB QUANT: CPT | Performed by: EMERGENCY MEDICINE

## 2022-02-16 PROCEDURE — 81001 URINALYSIS AUTO W/SCOPE: CPT | Performed by: EMERGENCY MEDICINE

## 2022-02-16 PROCEDURE — 99285 EMERGENCY DEPT VISIT HI MDM: CPT

## 2022-02-16 PROCEDURE — U0004 COV-19 TEST NON-CDC HGH THRU: HCPCS | Performed by: EMERGENCY MEDICINE

## 2022-02-16 PROCEDURE — 87147 CULTURE TYPE IMMUNOLOGIC: CPT | Performed by: EMERGENCY MEDICINE

## 2022-02-16 PROCEDURE — 83735 ASSAY OF MAGNESIUM: CPT | Performed by: EMERGENCY MEDICINE

## 2022-02-16 PROCEDURE — 70551 MRI BRAIN STEM W/O DYE: CPT

## 2022-02-16 RX ORDER — ONDANSETRON 4 MG/1
4 TABLET, FILM COATED ORAL EVERY 6 HOURS PRN
Status: DISCONTINUED | OUTPATIENT
Start: 2022-02-16 | End: 2022-03-01 | Stop reason: HOSPADM

## 2022-02-16 RX ORDER — ALBUMIN (HUMAN) 12.5 G/50ML
12.5 SOLUTION INTRAVENOUS AS NEEDED
Status: ACTIVE | OUTPATIENT
Start: 2022-02-16 | End: 2022-02-17

## 2022-02-16 RX ORDER — SODIUM CHLORIDE 0.9 % (FLUSH) 0.9 %
10 SYRINGE (ML) INJECTION AS NEEDED
Status: DISCONTINUED | OUTPATIENT
Start: 2022-02-16 | End: 2022-02-16

## 2022-02-16 RX ORDER — PANTOPRAZOLE SODIUM 40 MG/1
40 TABLET, DELAYED RELEASE ORAL EVERY MORNING
Status: DISCONTINUED | OUTPATIENT
Start: 2022-02-17 | End: 2022-02-25

## 2022-02-16 RX ORDER — ASPIRIN 325 MG
325 TABLET ORAL ONCE
Status: COMPLETED | OUTPATIENT
Start: 2022-02-16 | End: 2022-02-16

## 2022-02-16 RX ORDER — SERTRALINE HYDROCHLORIDE 100 MG/1
100 TABLET, FILM COATED ORAL DAILY
COMMUNITY
End: 2022-03-01 | Stop reason: HOSPADM

## 2022-02-16 RX ORDER — SEVELAMER CARBONATE 800 MG/1
800 TABLET, FILM COATED ORAL
Status: DISCONTINUED | OUTPATIENT
Start: 2022-02-17 | End: 2022-02-28

## 2022-02-16 RX ORDER — BUPROPION HYDROCHLORIDE 150 MG/1
150 TABLET, EXTENDED RELEASE ORAL 2 TIMES DAILY
Status: DISCONTINUED | OUTPATIENT
Start: 2022-02-16 | End: 2022-02-20

## 2022-02-16 RX ORDER — ONDANSETRON 2 MG/ML
4 INJECTION INTRAMUSCULAR; INTRAVENOUS EVERY 6 HOURS PRN
Status: DISCONTINUED | OUTPATIENT
Start: 2022-02-16 | End: 2022-03-01 | Stop reason: HOSPADM

## 2022-02-16 RX ORDER — BUMETANIDE 1 MG/1
2 TABLET ORAL 2 TIMES DAILY
Status: DISCONTINUED | OUTPATIENT
Start: 2022-02-16 | End: 2022-02-16

## 2022-02-16 RX ORDER — NICOTINE POLACRILEX 4 MG
15 LOZENGE BUCCAL
Status: DISCONTINUED | OUTPATIENT
Start: 2022-02-16 | End: 2022-03-01 | Stop reason: HOSPADM

## 2022-02-16 RX ORDER — HYDRALAZINE HYDROCHLORIDE 50 MG/1
50 TABLET, FILM COATED ORAL 2 TIMES DAILY
COMMUNITY
End: 2022-03-01 | Stop reason: HOSPADM

## 2022-02-16 RX ORDER — HEPARIN SODIUM 5000 [USP'U]/ML
5000 INJECTION, SOLUTION INTRAVENOUS; SUBCUTANEOUS EVERY 12 HOURS SCHEDULED
Status: DISCONTINUED | OUTPATIENT
Start: 2022-02-16 | End: 2022-03-01 | Stop reason: HOSPADM

## 2022-02-16 RX ORDER — DEXTROSE MONOHYDRATE 100 MG/ML
25 INJECTION, SOLUTION INTRAVENOUS
Status: DISCONTINUED | OUTPATIENT
Start: 2022-02-16 | End: 2022-03-01 | Stop reason: HOSPADM

## 2022-02-16 RX ORDER — ASPIRIN 81 MG/1
81 TABLET ORAL DAILY
Status: DISCONTINUED | OUTPATIENT
Start: 2022-02-17 | End: 2022-03-01 | Stop reason: HOSPADM

## 2022-02-16 RX ORDER — NITROGLYCERIN 0.4 MG/1
0.4 TABLET SUBLINGUAL
Status: DISCONTINUED | OUTPATIENT
Start: 2022-02-16 | End: 2022-02-17

## 2022-02-16 RX ORDER — SERTRALINE HYDROCHLORIDE 25 MG/1
100 TABLET, FILM COATED ORAL DAILY
Status: DISCONTINUED | OUTPATIENT
Start: 2022-02-17 | End: 2022-02-20

## 2022-02-16 RX ORDER — SODIUM CHLORIDE 0.9 % (FLUSH) 0.9 %
10 SYRINGE (ML) INJECTION AS NEEDED
Status: DISCONTINUED | OUTPATIENT
Start: 2022-02-16 | End: 2022-03-01 | Stop reason: HOSPADM

## 2022-02-16 RX ORDER — SODIUM CHLORIDE 0.9 % (FLUSH) 0.9 %
10 SYRINGE (ML) INJECTION EVERY 12 HOURS SCHEDULED
Status: DISCONTINUED | OUTPATIENT
Start: 2022-02-16 | End: 2022-03-01 | Stop reason: HOSPADM

## 2022-02-16 RX ORDER — INSULIN DEGLUDEC INJECTION 100 U/ML
20 INJECTION, SOLUTION SUBCUTANEOUS DAILY
COMMUNITY
End: 2022-03-01 | Stop reason: HOSPADM

## 2022-02-16 RX ORDER — HYDRALAZINE HYDROCHLORIDE 50 MG/1
50 TABLET, FILM COATED ORAL 2 TIMES DAILY
Status: DISCONTINUED | OUTPATIENT
Start: 2022-02-16 | End: 2022-02-16

## 2022-02-16 RX ORDER — ATORVASTATIN CALCIUM 40 MG/1
40 TABLET, FILM COATED ORAL NIGHTLY
Status: DISCONTINUED | OUTPATIENT
Start: 2022-02-16 | End: 2022-03-01 | Stop reason: HOSPADM

## 2022-02-16 RX ADMIN — SODIUM CHLORIDE, PRESERVATIVE FREE 10 ML: 5 INJECTION INTRAVENOUS at 22:34

## 2022-02-16 RX ADMIN — INSULIN DETEMIR 16 UNITS: 100 INJECTION, SOLUTION SUBCUTANEOUS at 22:30

## 2022-02-16 RX ADMIN — ASPIRIN 325 MG: 325 TABLET ORAL at 22:34

## 2022-02-16 RX ADMIN — HEPARIN SODIUM 5000 UNITS: 5000 INJECTION, SOLUTION INTRAVENOUS; SUBCUTANEOUS at 22:28

## 2022-02-17 LAB
ALBUMIN SERPL-MCNC: 3 G/DL (ref 3.5–5.2)
ALBUMIN/GLOB SERPL: 1 G/DL
ALP SERPL-CCNC: 100 U/L (ref 39–117)
ALT SERPL W P-5'-P-CCNC: 16 U/L (ref 1–41)
ANION GAP SERPL CALCULATED.3IONS-SCNC: 15.2 MMOL/L (ref 5–15)
ARTERIAL PATENCY WRIST A: ABNORMAL
AST SERPL-CCNC: 16 U/L (ref 1–40)
BASE EXCESS BLDA CALC-SCNC: -0.2 MMOL/L (ref -2–2)
BASOPHILS # BLD AUTO: 0.05 10*3/MM3 (ref 0–0.2)
BASOPHILS NFR BLD AUTO: 0.7 % (ref 0–1.5)
BDY SITE: ABNORMAL
BILIRUB SERPL-MCNC: 0.3 MG/DL (ref 0–1.2)
BUN SERPL-MCNC: 46 MG/DL (ref 6–20)
BUN/CREAT SERPL: 5.7 (ref 7–25)
CALCIUM SPEC-SCNC: 8.6 MG/DL (ref 8.6–10.5)
CHLORIDE SERPL-SCNC: 104 MMOL/L (ref 98–107)
CO2 SERPL-SCNC: 22.8 MMOL/L (ref 22–29)
COHGB MFR BLD: 0.3 % (ref 0–1.5)
CREAT SERPL-MCNC: 8.02 MG/DL (ref 0.76–1.27)
DEPRECATED RDW RBC AUTO: 66.3 FL (ref 37–54)
EOSINOPHIL # BLD AUTO: 0.07 10*3/MM3 (ref 0–0.4)
EOSINOPHIL NFR BLD AUTO: 1 % (ref 0.3–6.2)
ERYTHROCYTE [DISTWIDTH] IN BLOOD BY AUTOMATED COUNT: 17.6 % (ref 12.3–15.4)
FHHB: 2.4 % (ref 0–5)
GFR SERPL CREATININE-BSD FRML MDRD: 7 ML/MIN/1.73
GLOBULIN UR ELPH-MCNC: 3 GM/DL
GLUCOSE BLDC GLUCOMTR-MCNC: 111 MG/DL (ref 70–99)
GLUCOSE BLDC GLUCOMTR-MCNC: 70 MG/DL (ref 70–99)
GLUCOSE BLDC GLUCOMTR-MCNC: 83 MG/DL (ref 70–99)
GLUCOSE BLDC GLUCOMTR-MCNC: 88 MG/DL (ref 70–99)
GLUCOSE SERPL-MCNC: 83 MG/DL (ref 65–99)
HCO3 BLDA-SCNC: 27.1 MMOL/L (ref 22–26)
HCT VFR BLD AUTO: 33.1 % (ref 37.5–51)
HGB BLD-MCNC: 9.5 G/DL (ref 13–17.7)
HGB BLDA-MCNC: 10.7 G/DL (ref 13.8–16.4)
IMM GRANULOCYTES # BLD AUTO: 0.04 10*3/MM3 (ref 0–0.05)
IMM GRANULOCYTES NFR BLD AUTO: 0.6 % (ref 0–0.5)
INHALED O2 CONCENTRATION: 30 %
LYMPHOCYTES # BLD AUTO: 0.97 10*3/MM3 (ref 0.7–3.1)
LYMPHOCYTES NFR BLD AUTO: 13.8 % (ref 19.6–45.3)
MAGNESIUM SERPL-MCNC: 2.5 MG/DL (ref 1.6–2.6)
MCH RBC QN AUTO: 29.6 PG (ref 26.6–33)
MCHC RBC AUTO-ENTMCNC: 28.7 G/DL (ref 31.5–35.7)
MCV RBC AUTO: 103.1 FL (ref 79–97)
METHGB BLD QL: 0 % (ref 0–1.5)
MODALITY: ABNORMAL
MONOCYTES # BLD AUTO: 0.54 10*3/MM3 (ref 0.1–0.9)
MONOCYTES NFR BLD AUTO: 7.7 % (ref 5–12)
NEUTROPHILS NFR BLD AUTO: 5.37 10*3/MM3 (ref 1.7–7)
NEUTROPHILS NFR BLD AUTO: 76.2 % (ref 42.7–76)
NOTE: ABNORMAL
NRBC BLD AUTO-RTO: 0.7 /100 WBC (ref 0–0.2)
NT-PROBNP SERPL-MCNC: ABNORMAL PG/ML (ref 0–900)
OXYHGB MFR BLDV: 97.3 % (ref 94–99)
PCO2 BLDA: 57.4 MM HG (ref 35–45)
PH BLDA: 7.29 PH UNITS (ref 7.35–7.45)
PHOSPHATE SERPL-MCNC: 6.8 MG/DL (ref 2.5–4.5)
PLATELET # BLD AUTO: 200 10*3/MM3 (ref 140–450)
PMV BLD AUTO: 10.4 FL (ref 6–12)
PO2 BLD: 429 MM[HG] (ref 0–500)
PO2 BLDA: 128.7 MM HG (ref 80–100)
POTASSIUM SERPL-SCNC: 3.6 MMOL/L (ref 3.5–5.2)
PROCALCITONIN SERPL-MCNC: 1.14 NG/ML (ref 0–0.25)
PROT SERPL-MCNC: 6 G/DL (ref 6–8.5)
RBC # BLD AUTO: 3.21 10*6/MM3 (ref 4.14–5.8)
SAO2 % BLDCOA: 97.6 % (ref 95–99)
SODIUM SERPL-SCNC: 142 MMOL/L (ref 136–145)
TROPONIN T SERPL-MCNC: 0.64 NG/ML (ref 0–0.03)
WBC NRBC COR # BLD: 7.04 10*3/MM3 (ref 3.4–10.8)

## 2022-02-17 PROCEDURE — 97165 OT EVAL LOW COMPLEX 30 MIN: CPT

## 2022-02-17 PROCEDURE — 82962 GLUCOSE BLOOD TEST: CPT

## 2022-02-17 PROCEDURE — 25010000002 HEPARIN (PORCINE) PER 1000 UNITS: Performed by: INTERNAL MEDICINE

## 2022-02-17 PROCEDURE — 83735 ASSAY OF MAGNESIUM: CPT | Performed by: INTERNAL MEDICINE

## 2022-02-17 PROCEDURE — 82805 BLOOD GASES W/O2 SATURATION: CPT | Performed by: INTERNAL MEDICINE

## 2022-02-17 PROCEDURE — 25010000002 VANCOMYCIN 5 G RECONSTITUTED SOLUTION: Performed by: INTERNAL MEDICINE

## 2022-02-17 PROCEDURE — 63710000001 INSULIN DETEMIR PER 5 UNITS: Performed by: INTERNAL MEDICINE

## 2022-02-17 PROCEDURE — 36600 WITHDRAWAL OF ARTERIAL BLOOD: CPT | Performed by: INTERNAL MEDICINE

## 2022-02-17 PROCEDURE — 94799 UNLISTED PULMONARY SVC/PX: CPT

## 2022-02-17 PROCEDURE — 82375 ASSAY CARBOXYHB QUANT: CPT | Performed by: INTERNAL MEDICINE

## 2022-02-17 PROCEDURE — 80053 COMPREHEN METABOLIC PANEL: CPT | Performed by: INTERNAL MEDICINE

## 2022-02-17 PROCEDURE — 84145 PROCALCITONIN (PCT): CPT | Performed by: INTERNAL MEDICINE

## 2022-02-17 PROCEDURE — 94660 CPAP INITIATION&MGMT: CPT

## 2022-02-17 PROCEDURE — 84484 ASSAY OF TROPONIN QUANT: CPT | Performed by: INTERNAL MEDICINE

## 2022-02-17 PROCEDURE — 36415 COLL VENOUS BLD VENIPUNCTURE: CPT | Performed by: INTERNAL MEDICINE

## 2022-02-17 PROCEDURE — 97110 THERAPEUTIC EXERCISES: CPT

## 2022-02-17 PROCEDURE — 83880 ASSAY OF NATRIURETIC PEPTIDE: CPT | Performed by: INTERNAL MEDICINE

## 2022-02-17 PROCEDURE — 87040 BLOOD CULTURE FOR BACTERIA: CPT | Performed by: INTERNAL MEDICINE

## 2022-02-17 PROCEDURE — 99233 SBSQ HOSP IP/OBS HIGH 50: CPT | Performed by: INTERNAL MEDICINE

## 2022-02-17 PROCEDURE — 83050 HGB METHEMOGLOBIN QUAN: CPT | Performed by: INTERNAL MEDICINE

## 2022-02-17 PROCEDURE — 85025 COMPLETE CBC W/AUTO DIFF WBC: CPT | Performed by: INTERNAL MEDICINE

## 2022-02-17 PROCEDURE — 99221 1ST HOSP IP/OBS SF/LOW 40: CPT | Performed by: PSYCHIATRY & NEUROLOGY

## 2022-02-17 PROCEDURE — 84100 ASSAY OF PHOSPHORUS: CPT | Performed by: INTERNAL MEDICINE

## 2022-02-17 RX ADMIN — SERTRALINE 100 MG: 25 TABLET, FILM COATED ORAL at 09:32

## 2022-02-17 RX ADMIN — ASPIRIN 81 MG: 81 TABLET, COATED ORAL at 09:32

## 2022-02-17 RX ADMIN — BUPROPION HYDROCHLORIDE 150 MG: 150 TABLET, EXTENDED RELEASE ORAL at 00:27

## 2022-02-17 RX ADMIN — SEVELAMER CARBONATE 800 MG: 800 TABLET, FILM COATED ORAL at 17:03

## 2022-02-17 RX ADMIN — INSULIN DETEMIR 16 UNITS: 100 INJECTION, SOLUTION SUBCUTANEOUS at 09:33

## 2022-02-17 RX ADMIN — ATORVASTATIN CALCIUM 40 MG: 40 TABLET, FILM COATED ORAL at 00:27

## 2022-02-17 RX ADMIN — VANCOMYCIN HYDROCHLORIDE 2000 MG: 5 INJECTION, POWDER, LYOPHILIZED, FOR SOLUTION INTRAVENOUS at 14:46

## 2022-02-17 RX ADMIN — SODIUM CHLORIDE, PRESERVATIVE FREE 10 ML: 5 INJECTION INTRAVENOUS at 22:59

## 2022-02-17 RX ADMIN — PANTOPRAZOLE SODIUM 40 MG: 40 TABLET, DELAYED RELEASE ORAL at 06:23

## 2022-02-17 RX ADMIN — HEPARIN SODIUM 5000 UNITS: 5000 INJECTION, SOLUTION INTRAVENOUS; SUBCUTANEOUS at 09:32

## 2022-02-17 RX ADMIN — ATORVASTATIN CALCIUM 40 MG: 40 TABLET, FILM COATED ORAL at 22:58

## 2022-02-17 RX ADMIN — BUPROPION HYDROCHLORIDE 150 MG: 150 TABLET, EXTENDED RELEASE ORAL at 09:32

## 2022-02-17 RX ADMIN — HEPARIN SODIUM 5000 UNITS: 5000 INJECTION, SOLUTION INTRAVENOUS; SUBCUTANEOUS at 22:58

## 2022-02-17 RX ADMIN — SEVELAMER CARBONATE 800 MG: 800 TABLET, FILM COATED ORAL at 09:32

## 2022-02-17 RX ADMIN — SEVELAMER CARBONATE 800 MG: 800 TABLET, FILM COATED ORAL at 13:39

## 2022-02-17 RX ADMIN — BUPROPION HYDROCHLORIDE 150 MG: 150 TABLET, EXTENDED RELEASE ORAL at 22:58

## 2022-02-17 RX ADMIN — SODIUM CHLORIDE, PRESERVATIVE FREE 10 ML: 5 INJECTION INTRAVENOUS at 09:33

## 2022-02-18 ENCOUNTER — APPOINTMENT (OUTPATIENT)
Dept: NEUROLOGY | Facility: HOSPITAL | Age: 62
End: 2022-02-18

## 2022-02-18 LAB
ANION GAP SERPL CALCULATED.3IONS-SCNC: 13.2 MMOL/L (ref 5–15)
BUN SERPL-MCNC: 25 MG/DL (ref 6–20)
BUN/CREAT SERPL: 5 (ref 7–25)
CALCIUM SPEC-SCNC: 8.5 MG/DL (ref 8.6–10.5)
CHLORIDE SERPL-SCNC: 105 MMOL/L (ref 98–107)
CO2 SERPL-SCNC: 23.8 MMOL/L (ref 22–29)
CREAT SERPL-MCNC: 4.97 MG/DL (ref 0.76–1.27)
DEPRECATED RDW RBC AUTO: 66.3 FL (ref 37–54)
ERYTHROCYTE [DISTWIDTH] IN BLOOD BY AUTOMATED COUNT: 17.5 % (ref 12.3–15.4)
GFR SERPL CREATININE-BSD FRML MDRD: 12 ML/MIN/1.73
GLUCOSE BLDC GLUCOMTR-MCNC: 107 MG/DL (ref 70–99)
GLUCOSE BLDC GLUCOMTR-MCNC: 161 MG/DL (ref 70–99)
GLUCOSE BLDC GLUCOMTR-MCNC: 51 MG/DL (ref 70–99)
GLUCOSE BLDC GLUCOMTR-MCNC: 60 MG/DL (ref 70–99)
GLUCOSE BLDC GLUCOMTR-MCNC: 67 MG/DL (ref 70–99)
GLUCOSE BLDC GLUCOMTR-MCNC: 77 MG/DL (ref 70–99)
GLUCOSE BLDC GLUCOMTR-MCNC: 84 MG/DL (ref 70–99)
GLUCOSE SERPL-MCNC: 59 MG/DL (ref 65–99)
HCT VFR BLD AUTO: 33.1 % (ref 37.5–51)
HGB BLD-MCNC: 9.5 G/DL (ref 13–17.7)
MCH RBC QN AUTO: 30 PG (ref 26.6–33)
MCHC RBC AUTO-ENTMCNC: 28.7 G/DL (ref 31.5–35.7)
MCV RBC AUTO: 104.4 FL (ref 79–97)
PLATELET # BLD AUTO: 203 10*3/MM3 (ref 140–450)
PMV BLD AUTO: 10.4 FL (ref 6–12)
POTASSIUM SERPL-SCNC: 3.6 MMOL/L (ref 3.5–5.2)
RBC # BLD AUTO: 3.17 10*6/MM3 (ref 4.14–5.8)
SODIUM SERPL-SCNC: 142 MMOL/L (ref 136–145)
WBC NRBC COR # BLD: 7.44 10*3/MM3 (ref 3.4–10.8)

## 2022-02-18 PROCEDURE — 97161 PT EVAL LOW COMPLEX 20 MIN: CPT

## 2022-02-18 PROCEDURE — 85027 COMPLETE CBC AUTOMATED: CPT | Performed by: INTERNAL MEDICINE

## 2022-02-18 PROCEDURE — 25010000002 HEPARIN (PORCINE) PER 1000 UNITS: Performed by: INTERNAL MEDICINE

## 2022-02-18 PROCEDURE — 63710000001 INSULIN DETEMIR PER 5 UNITS: Performed by: INTERNAL MEDICINE

## 2022-02-18 PROCEDURE — 63710000001 INSULIN LISPRO (HUMAN) PER 5 UNITS: Performed by: INTERNAL MEDICINE

## 2022-02-18 PROCEDURE — 95819 EEG AWAKE AND ASLEEP: CPT

## 2022-02-18 PROCEDURE — 99233 SBSQ HOSP IP/OBS HIGH 50: CPT | Performed by: INTERNAL MEDICINE

## 2022-02-18 PROCEDURE — 82962 GLUCOSE BLOOD TEST: CPT

## 2022-02-18 PROCEDURE — 80048 BASIC METABOLIC PNL TOTAL CA: CPT | Performed by: INTERNAL MEDICINE

## 2022-02-18 PROCEDURE — 95816 EEG AWAKE AND DROWSY: CPT | Performed by: PSYCHIATRY & NEUROLOGY

## 2022-02-18 RX ADMIN — PANTOPRAZOLE SODIUM 40 MG: 40 TABLET, DELAYED RELEASE ORAL at 06:18

## 2022-02-18 RX ADMIN — ASPIRIN 81 MG: 81 TABLET, COATED ORAL at 08:17

## 2022-02-18 RX ADMIN — INSULIN DETEMIR 10 UNITS: 100 INJECTION, SOLUTION SUBCUTANEOUS at 21:07

## 2022-02-18 RX ADMIN — SEVELAMER CARBONATE 800 MG: 800 TABLET, FILM COATED ORAL at 12:20

## 2022-02-18 RX ADMIN — HEPARIN SODIUM 5000 UNITS: 5000 INJECTION, SOLUTION INTRAVENOUS; SUBCUTANEOUS at 08:18

## 2022-02-18 RX ADMIN — SERTRALINE 100 MG: 25 TABLET, FILM COATED ORAL at 08:18

## 2022-02-18 RX ADMIN — BUPROPION HYDROCHLORIDE 150 MG: 150 TABLET, EXTENDED RELEASE ORAL at 20:56

## 2022-02-18 RX ADMIN — BUPROPION HYDROCHLORIDE 150 MG: 150 TABLET, EXTENDED RELEASE ORAL at 08:20

## 2022-02-18 RX ADMIN — ATORVASTATIN CALCIUM 40 MG: 40 TABLET, FILM COATED ORAL at 20:56

## 2022-02-18 RX ADMIN — SEVELAMER CARBONATE 800 MG: 800 TABLET, FILM COATED ORAL at 08:20

## 2022-02-18 RX ADMIN — SEVELAMER CARBONATE 800 MG: 800 TABLET, FILM COATED ORAL at 17:50

## 2022-02-18 RX ADMIN — INSULIN LISPRO 2 UNITS: 100 INJECTION, SOLUTION INTRAVENOUS; SUBCUTANEOUS at 21:20

## 2022-02-18 RX ADMIN — SODIUM CHLORIDE, PRESERVATIVE FREE 10 ML: 5 INJECTION INTRAVENOUS at 08:18

## 2022-02-18 RX ADMIN — SODIUM CHLORIDE, PRESERVATIVE FREE 10 ML: 5 INJECTION INTRAVENOUS at 21:17

## 2022-02-18 RX ADMIN — HEPARIN SODIUM 5000 UNITS: 5000 INJECTION, SOLUTION INTRAVENOUS; SUBCUTANEOUS at 20:56

## 2022-02-19 LAB
ALBUMIN SERPL-MCNC: 3.1 G/DL (ref 3.5–5.2)
ANION GAP SERPL CALCULATED.3IONS-SCNC: 12.9 MMOL/L (ref 5–15)
BACTERIA SPEC AEROBE CULT: ABNORMAL
BUN SERPL-MCNC: 32 MG/DL (ref 6–20)
BUN/CREAT SERPL: 5.4 (ref 7–25)
CALCIUM SPEC-SCNC: 8.8 MG/DL (ref 8.6–10.5)
CHLORIDE SERPL-SCNC: 103 MMOL/L (ref 98–107)
CK SERPL-CCNC: 42 U/L (ref 20–200)
CO2 SERPL-SCNC: 22.1 MMOL/L (ref 22–29)
CREAT SERPL-MCNC: 5.91 MG/DL (ref 0.76–1.27)
GFR SERPL CREATININE-BSD FRML MDRD: 10 ML/MIN/1.73
GLUCOSE BLDC GLUCOMTR-MCNC: 105 MG/DL (ref 70–99)
GLUCOSE BLDC GLUCOMTR-MCNC: 111 MG/DL (ref 70–99)
GLUCOSE BLDC GLUCOMTR-MCNC: 60 MG/DL (ref 70–99)
GLUCOSE BLDC GLUCOMTR-MCNC: 61 MG/DL (ref 70–99)
GLUCOSE BLDC GLUCOMTR-MCNC: 72 MG/DL (ref 70–99)
GLUCOSE BLDC GLUCOMTR-MCNC: 93 MG/DL (ref 70–99)
GLUCOSE SERPL-MCNC: 103 MG/DL (ref 65–99)
GRAM STN SPEC: ABNORMAL
ISOLATED FROM: ABNORMAL
PHOSPHATE SERPL-MCNC: 3.8 MG/DL (ref 2.5–4.5)
POTASSIUM SERPL-SCNC: 3.7 MMOL/L (ref 3.5–5.2)
SODIUM SERPL-SCNC: 138 MMOL/L (ref 136–145)
VANCOMYCIN SERPL-MCNC: 14.49 MCG/ML (ref 5–40)

## 2022-02-19 PROCEDURE — 80202 ASSAY OF VANCOMYCIN: CPT | Performed by: INTERNAL MEDICINE

## 2022-02-19 PROCEDURE — 25010000002 CEFAZOLIN 1-4 GM/50ML-% SOLUTION: Performed by: INTERNAL MEDICINE

## 2022-02-19 PROCEDURE — 25010000002 HEPARIN (PORCINE) PER 1000 UNITS: Performed by: INTERNAL MEDICINE

## 2022-02-19 PROCEDURE — 94799 UNLISTED PULMONARY SVC/PX: CPT

## 2022-02-19 PROCEDURE — 82550 ASSAY OF CK (CPK): CPT | Performed by: PSYCHIATRY & NEUROLOGY

## 2022-02-19 PROCEDURE — 82962 GLUCOSE BLOOD TEST: CPT

## 2022-02-19 PROCEDURE — 80069 RENAL FUNCTION PANEL: CPT | Performed by: INTERNAL MEDICINE

## 2022-02-19 PROCEDURE — 63710000001 INSULIN DETEMIR PER 5 UNITS: Performed by: INTERNAL MEDICINE

## 2022-02-19 PROCEDURE — 99232 SBSQ HOSP IP/OBS MODERATE 35: CPT | Performed by: PSYCHIATRY & NEUROLOGY

## 2022-02-19 PROCEDURE — 99233 SBSQ HOSP IP/OBS HIGH 50: CPT | Performed by: INTERNAL MEDICINE

## 2022-02-19 RX ORDER — CEFAZOLIN SODIUM 1 G/50ML
1 INJECTION, SOLUTION INTRAVENOUS EVERY 24 HOURS
Status: DISCONTINUED | OUTPATIENT
Start: 2022-02-19 | End: 2022-03-01 | Stop reason: HOSPADM

## 2022-02-19 RX ADMIN — HEPARIN SODIUM 5000 UNITS: 5000 INJECTION, SOLUTION INTRAVENOUS; SUBCUTANEOUS at 20:15

## 2022-02-19 RX ADMIN — ASPIRIN 81 MG: 81 TABLET, COATED ORAL at 08:51

## 2022-02-19 RX ADMIN — ATORVASTATIN CALCIUM 40 MG: 40 TABLET, FILM COATED ORAL at 20:15

## 2022-02-19 RX ADMIN — PANTOPRAZOLE SODIUM 40 MG: 40 TABLET, DELAYED RELEASE ORAL at 08:51

## 2022-02-19 RX ADMIN — BUPROPION HYDROCHLORIDE 150 MG: 150 TABLET, EXTENDED RELEASE ORAL at 08:51

## 2022-02-19 RX ADMIN — SEVELAMER CARBONATE 800 MG: 800 TABLET, FILM COATED ORAL at 08:51

## 2022-02-19 RX ADMIN — Medication: at 09:39

## 2022-02-19 RX ADMIN — HEPARIN SODIUM 5000 UNITS: 5000 INJECTION, SOLUTION INTRAVENOUS; SUBCUTANEOUS at 08:51

## 2022-02-19 RX ADMIN — SODIUM CHLORIDE, PRESERVATIVE FREE 10 ML: 5 INJECTION INTRAVENOUS at 20:16

## 2022-02-19 RX ADMIN — SERTRALINE 100 MG: 25 TABLET, FILM COATED ORAL at 08:51

## 2022-02-19 RX ADMIN — SEVELAMER CARBONATE 800 MG: 800 TABLET, FILM COATED ORAL at 17:25

## 2022-02-19 RX ADMIN — SODIUM CHLORIDE, PRESERVATIVE FREE 10 ML: 5 INJECTION INTRAVENOUS at 08:54

## 2022-02-19 RX ADMIN — CEFAZOLIN SODIUM 1 G: 1 INJECTION, SOLUTION INTRAVENOUS at 20:16

## 2022-02-19 RX ADMIN — INSULIN DETEMIR 10 UNITS: 100 INJECTION, SOLUTION SUBCUTANEOUS at 08:52

## 2022-02-19 RX ADMIN — BUPROPION HYDROCHLORIDE 150 MG: 150 TABLET, EXTENDED RELEASE ORAL at 20:15

## 2022-02-20 ENCOUNTER — APPOINTMENT (OUTPATIENT)
Dept: CT IMAGING | Facility: HOSPITAL | Age: 62
End: 2022-02-20

## 2022-02-20 LAB
GLUCOSE BLDC GLUCOMTR-MCNC: 116 MG/DL (ref 70–99)
GLUCOSE BLDC GLUCOMTR-MCNC: 65 MG/DL (ref 70–99)
GLUCOSE BLDC GLUCOMTR-MCNC: 76 MG/DL (ref 70–99)
GLUCOSE BLDC GLUCOMTR-MCNC: 89 MG/DL (ref 70–99)
GLUCOSE BLDC GLUCOMTR-MCNC: 92 MG/DL (ref 70–99)

## 2022-02-20 PROCEDURE — 25010000002 HEPARIN (PORCINE) PER 1000 UNITS: Performed by: INTERNAL MEDICINE

## 2022-02-20 PROCEDURE — 99233 SBSQ HOSP IP/OBS HIGH 50: CPT | Performed by: INTERNAL MEDICINE

## 2022-02-20 PROCEDURE — 25010000002 CEFAZOLIN 1-4 GM/50ML-% SOLUTION: Performed by: INTERNAL MEDICINE

## 2022-02-20 PROCEDURE — 94799 UNLISTED PULMONARY SVC/PX: CPT

## 2022-02-20 PROCEDURE — 97110 THERAPEUTIC EXERCISES: CPT

## 2022-02-20 PROCEDURE — 82962 GLUCOSE BLOOD TEST: CPT

## 2022-02-20 PROCEDURE — 72131 CT LUMBAR SPINE W/O DYE: CPT

## 2022-02-20 RX ORDER — BUPROPION HYDROCHLORIDE 100 MG/1
100 TABLET, EXTENDED RELEASE ORAL DAILY
Status: DISCONTINUED | OUTPATIENT
Start: 2022-02-21 | End: 2022-03-01 | Stop reason: HOSPADM

## 2022-02-20 RX ORDER — SERTRALINE HYDROCHLORIDE 25 MG/1
50 TABLET, FILM COATED ORAL DAILY
Status: DISCONTINUED | OUTPATIENT
Start: 2022-02-21 | End: 2022-03-01 | Stop reason: HOSPADM

## 2022-02-20 RX ADMIN — SODIUM CHLORIDE, PRESERVATIVE FREE 10 ML: 5 INJECTION INTRAVENOUS at 23:05

## 2022-02-20 RX ADMIN — SERTRALINE 100 MG: 25 TABLET, FILM COATED ORAL at 08:13

## 2022-02-20 RX ADMIN — SEVELAMER CARBONATE 800 MG: 800 TABLET, FILM COATED ORAL at 17:30

## 2022-02-20 RX ADMIN — ASPIRIN 81 MG: 81 TABLET, COATED ORAL at 08:14

## 2022-02-20 RX ADMIN — ATORVASTATIN CALCIUM 40 MG: 40 TABLET, FILM COATED ORAL at 23:04

## 2022-02-20 RX ADMIN — SEVELAMER CARBONATE 800 MG: 800 TABLET, FILM COATED ORAL at 13:20

## 2022-02-20 RX ADMIN — Medication: at 08:14

## 2022-02-20 RX ADMIN — BUPROPION HYDROCHLORIDE 150 MG: 150 TABLET, EXTENDED RELEASE ORAL at 08:14

## 2022-02-20 RX ADMIN — CEFAZOLIN SODIUM 1 G: 1 INJECTION, SOLUTION INTRAVENOUS at 23:05

## 2022-02-20 RX ADMIN — SEVELAMER CARBONATE 800 MG: 800 TABLET, FILM COATED ORAL at 08:14

## 2022-02-20 RX ADMIN — HEPARIN SODIUM 5000 UNITS: 5000 INJECTION, SOLUTION INTRAVENOUS; SUBCUTANEOUS at 23:04

## 2022-02-20 RX ADMIN — HEPARIN SODIUM 5000 UNITS: 5000 INJECTION, SOLUTION INTRAVENOUS; SUBCUTANEOUS at 08:13

## 2022-02-20 RX ADMIN — PANTOPRAZOLE SODIUM 40 MG: 40 TABLET, DELAYED RELEASE ORAL at 06:23

## 2022-02-20 RX ADMIN — SODIUM CHLORIDE, PRESERVATIVE FREE 10 ML: 5 INJECTION INTRAVENOUS at 08:14

## 2022-02-21 LAB
ANION GAP SERPL CALCULATED.3IONS-SCNC: 11.8 MMOL/L (ref 5–15)
BACTERIA SPEC AEROBE CULT: NORMAL
BUN SERPL-MCNC: 27 MG/DL (ref 6–20)
BUN/CREAT SERPL: 5.4 (ref 7–25)
CALCIUM SPEC-SCNC: 8.7 MG/DL (ref 8.6–10.5)
CHLORIDE SERPL-SCNC: 99 MMOL/L (ref 98–107)
CO2 SERPL-SCNC: 25.2 MMOL/L (ref 22–29)
CREAT SERPL-MCNC: 4.97 MG/DL (ref 0.76–1.27)
FOLATE SERPL-MCNC: 3.76 NG/ML (ref 4.78–24.2)
GFR SERPL CREATININE-BSD FRML MDRD: 12 ML/MIN/1.73
GLUCOSE BLDC GLUCOMTR-MCNC: 130 MG/DL (ref 70–99)
GLUCOSE BLDC GLUCOMTR-MCNC: 141 MG/DL (ref 70–99)
GLUCOSE BLDC GLUCOMTR-MCNC: 75 MG/DL (ref 70–99)
GLUCOSE BLDC GLUCOMTR-MCNC: 92 MG/DL (ref 70–99)
GLUCOSE SERPL-MCNC: 85 MG/DL (ref 65–99)
POTASSIUM SERPL-SCNC: 3.9 MMOL/L (ref 3.5–5.2)
SODIUM SERPL-SCNC: 136 MMOL/L (ref 136–145)
VIT B12 BLD-MCNC: 1117 PG/ML (ref 211–946)

## 2022-02-21 PROCEDURE — 82962 GLUCOSE BLOOD TEST: CPT

## 2022-02-21 PROCEDURE — 25010000002 ONDANSETRON PER 1 MG: Performed by: INTERNAL MEDICINE

## 2022-02-21 PROCEDURE — 82746 ASSAY OF FOLIC ACID SERUM: CPT | Performed by: INTERNAL MEDICINE

## 2022-02-21 PROCEDURE — 82607 VITAMIN B-12: CPT | Performed by: INTERNAL MEDICINE

## 2022-02-21 PROCEDURE — 63710000001 INSULIN DETEMIR PER 5 UNITS: Performed by: INTERNAL MEDICINE

## 2022-02-21 PROCEDURE — 99231 SBSQ HOSP IP/OBS SF/LOW 25: CPT | Performed by: NEUROLOGICAL SURGERY

## 2022-02-21 PROCEDURE — 80048 BASIC METABOLIC PNL TOTAL CA: CPT | Performed by: INTERNAL MEDICINE

## 2022-02-21 PROCEDURE — 25010000002 HEPARIN (PORCINE) PER 1000 UNITS: Performed by: INTERNAL MEDICINE

## 2022-02-21 PROCEDURE — 99233 SBSQ HOSP IP/OBS HIGH 50: CPT | Performed by: INTERNAL MEDICINE

## 2022-02-21 RX ORDER — MULTIPLE VITAMINS W/ MINERALS TAB 9MG-400MCG
1 TAB ORAL DAILY
Status: DISCONTINUED | OUTPATIENT
Start: 2022-02-21 | End: 2022-03-01 | Stop reason: HOSPADM

## 2022-02-21 RX ADMIN — ASPIRIN 81 MG: 81 TABLET, COATED ORAL at 08:29

## 2022-02-21 RX ADMIN — INSULIN DETEMIR 10 UNITS: 100 INJECTION, SOLUTION SUBCUTANEOUS at 08:45

## 2022-02-21 RX ADMIN — HEPARIN SODIUM 5000 UNITS: 5000 INJECTION, SOLUTION INTRAVENOUS; SUBCUTANEOUS at 08:29

## 2022-02-21 RX ADMIN — BUPROPION HYDROCHLORIDE 100 MG: 100 TABLET, FILM COATED, EXTENDED RELEASE ORAL at 12:52

## 2022-02-21 RX ADMIN — ONDANSETRON 4 MG: 2 INJECTION INTRAMUSCULAR; INTRAVENOUS at 08:47

## 2022-02-21 RX ADMIN — Medication 1 TABLET: at 16:45

## 2022-02-21 RX ADMIN — SERTRALINE 50 MG: 25 TABLET, FILM COATED ORAL at 08:46

## 2022-02-21 RX ADMIN — PANTOPRAZOLE SODIUM 40 MG: 40 TABLET, DELAYED RELEASE ORAL at 08:29

## 2022-02-21 RX ADMIN — SODIUM CHLORIDE, PRESERVATIVE FREE 10 ML: 5 INJECTION INTRAVENOUS at 08:29

## 2022-02-21 RX ADMIN — ATORVASTATIN CALCIUM 40 MG: 40 TABLET, FILM COATED ORAL at 22:44

## 2022-02-21 RX ADMIN — SODIUM CHLORIDE, PRESERVATIVE FREE 10 ML: 5 INJECTION INTRAVENOUS at 22:45

## 2022-02-21 RX ADMIN — SEVELAMER CARBONATE 800 MG: 800 TABLET, FILM COATED ORAL at 16:45

## 2022-02-21 RX ADMIN — SEVELAMER CARBONATE 800 MG: 800 TABLET, FILM COATED ORAL at 12:52

## 2022-02-21 RX ADMIN — HEPARIN SODIUM 5000 UNITS: 5000 INJECTION, SOLUTION INTRAVENOUS; SUBCUTANEOUS at 22:44

## 2022-02-22 LAB
ANION GAP SERPL CALCULATED.3IONS-SCNC: 10.7 MMOL/L (ref 5–15)
BACTERIA SPEC AEROBE CULT: NORMAL
BACTERIA SPEC AEROBE CULT: NORMAL
BUN SERPL-MCNC: 35 MG/DL (ref 6–20)
BUN/CREAT SERPL: 6.4 (ref 7–25)
CALCIUM SPEC-SCNC: 8.8 MG/DL (ref 8.6–10.5)
CHLORIDE SERPL-SCNC: 99 MMOL/L (ref 98–107)
CO2 SERPL-SCNC: 25.3 MMOL/L (ref 22–29)
CREAT SERPL-MCNC: 5.46 MG/DL (ref 0.76–1.27)
GFR SERPL CREATININE-BSD FRML MDRD: 11 ML/MIN/1.73
GLUCOSE BLDC GLUCOMTR-MCNC: 76 MG/DL (ref 70–99)
GLUCOSE BLDC GLUCOMTR-MCNC: 91 MG/DL (ref 70–99)
GLUCOSE BLDC GLUCOMTR-MCNC: 94 MG/DL (ref 70–99)
GLUCOSE SERPL-MCNC: 93 MG/DL (ref 65–99)
POTASSIUM SERPL-SCNC: 3.9 MMOL/L (ref 3.5–5.2)
SODIUM SERPL-SCNC: 135 MMOL/L (ref 136–145)

## 2022-02-22 PROCEDURE — 25010000002 ONDANSETRON PER 1 MG: Performed by: INTERNAL MEDICINE

## 2022-02-22 PROCEDURE — 25010000002 EPOETIN ALFA PER 1000 UNITS: Performed by: INTERNAL MEDICINE

## 2022-02-22 PROCEDURE — 82962 GLUCOSE BLOOD TEST: CPT

## 2022-02-22 PROCEDURE — 99232 SBSQ HOSP IP/OBS MODERATE 35: CPT | Performed by: INTERNAL MEDICINE

## 2022-02-22 PROCEDURE — 80048 BASIC METABOLIC PNL TOTAL CA: CPT | Performed by: INTERNAL MEDICINE

## 2022-02-22 PROCEDURE — 25010000002 CEFAZOLIN 1-4 GM/50ML-% SOLUTION: Performed by: INTERNAL MEDICINE

## 2022-02-22 PROCEDURE — 25010000002 HEPARIN (PORCINE) PER 1000 UNITS: Performed by: INTERNAL MEDICINE

## 2022-02-22 PROCEDURE — 97110 THERAPEUTIC EXERCISES: CPT

## 2022-02-22 RX ADMIN — SODIUM CHLORIDE, PRESERVATIVE FREE 10 ML: 5 INJECTION INTRAVENOUS at 20:56

## 2022-02-22 RX ADMIN — SEVELAMER CARBONATE 800 MG: 800 TABLET, FILM COATED ORAL at 17:14

## 2022-02-22 RX ADMIN — ATORVASTATIN CALCIUM 40 MG: 40 TABLET, FILM COATED ORAL at 20:55

## 2022-02-22 RX ADMIN — Medication 1 TABLET: at 09:02

## 2022-02-22 RX ADMIN — EPOETIN ALFA 6000 UNITS: 3000 SOLUTION INTRAVENOUS; SUBCUTANEOUS at 12:14

## 2022-02-22 RX ADMIN — ONDANSETRON 4 MG: 2 INJECTION INTRAMUSCULAR; INTRAVENOUS at 18:52

## 2022-02-22 RX ADMIN — ASPIRIN 81 MG: 81 TABLET, COATED ORAL at 09:01

## 2022-02-22 RX ADMIN — SEVELAMER CARBONATE 800 MG: 800 TABLET, FILM COATED ORAL at 09:02

## 2022-02-22 RX ADMIN — HEPARIN SODIUM 5000 UNITS: 5000 INJECTION, SOLUTION INTRAVENOUS; SUBCUTANEOUS at 21:04

## 2022-02-22 RX ADMIN — SERTRALINE 50 MG: 25 TABLET, FILM COATED ORAL at 09:02

## 2022-02-22 RX ADMIN — CEFAZOLIN SODIUM 1 G: 1 INJECTION, SOLUTION INTRAVENOUS at 20:55

## 2022-02-22 RX ADMIN — BUPROPION HYDROCHLORIDE 100 MG: 100 TABLET, FILM COATED, EXTENDED RELEASE ORAL at 09:02

## 2022-02-22 RX ADMIN — SODIUM CHLORIDE, PRESERVATIVE FREE 10 ML: 5 INJECTION INTRAVENOUS at 09:01

## 2022-02-22 RX ADMIN — HEPARIN SODIUM 5000 UNITS: 5000 INJECTION, SOLUTION INTRAVENOUS; SUBCUTANEOUS at 09:02

## 2022-02-22 RX ADMIN — CEFAZOLIN SODIUM 1 G: 1 INJECTION, SOLUTION INTRAVENOUS at 01:31

## 2022-02-23 LAB
ANION GAP SERPL CALCULATED.3IONS-SCNC: 12.5 MMOL/L (ref 5–15)
BUN SERPL-MCNC: 20 MG/DL (ref 6–20)
BUN/CREAT SERPL: 5.1 (ref 7–25)
CALCIUM SPEC-SCNC: 9 MG/DL (ref 8.6–10.5)
CHLORIDE SERPL-SCNC: 100 MMOL/L (ref 98–107)
CO2 SERPL-SCNC: 26.5 MMOL/L (ref 22–29)
CREAT SERPL-MCNC: 3.9 MG/DL (ref 0.76–1.27)
DEPRECATED RDW RBC AUTO: 61.9 FL (ref 37–54)
ERYTHROCYTE [DISTWIDTH] IN BLOOD BY AUTOMATED COUNT: 16.9 % (ref 12.3–15.4)
GFR SERPL CREATININE-BSD FRML MDRD: 16 ML/MIN/1.73
GLUCOSE BLDC GLUCOMTR-MCNC: 100 MG/DL (ref 70–99)
GLUCOSE BLDC GLUCOMTR-MCNC: 147 MG/DL (ref 70–99)
GLUCOSE BLDC GLUCOMTR-MCNC: 148 MG/DL (ref 70–99)
GLUCOSE BLDC GLUCOMTR-MCNC: 89 MG/DL (ref 70–99)
GLUCOSE SERPL-MCNC: 98 MG/DL (ref 65–99)
HCT VFR BLD AUTO: 38 % (ref 37.5–51)
HGB BLD-MCNC: 11.1 G/DL (ref 13–17.7)
MAGNESIUM SERPL-MCNC: 2.4 MG/DL (ref 1.6–2.6)
MCH RBC QN AUTO: 29.4 PG (ref 26.6–33)
MCHC RBC AUTO-ENTMCNC: 29.2 G/DL (ref 31.5–35.7)
MCV RBC AUTO: 100.5 FL (ref 79–97)
PLATELET # BLD AUTO: 213 10*3/MM3 (ref 140–450)
PMV BLD AUTO: 10.5 FL (ref 6–12)
POTASSIUM SERPL-SCNC: 3.4 MMOL/L (ref 3.5–5.2)
RBC # BLD AUTO: 3.78 10*6/MM3 (ref 4.14–5.8)
SODIUM SERPL-SCNC: 139 MMOL/L (ref 136–145)
WBC NRBC COR # BLD: 7.27 10*3/MM3 (ref 3.4–10.8)

## 2022-02-23 PROCEDURE — 80048 BASIC METABOLIC PNL TOTAL CA: CPT | Performed by: INTERNAL MEDICINE

## 2022-02-23 PROCEDURE — 97530 THERAPEUTIC ACTIVITIES: CPT

## 2022-02-23 PROCEDURE — 97110 THERAPEUTIC EXERCISES: CPT

## 2022-02-23 PROCEDURE — 85027 COMPLETE CBC AUTOMATED: CPT | Performed by: INTERNAL MEDICINE

## 2022-02-23 PROCEDURE — 82962 GLUCOSE BLOOD TEST: CPT

## 2022-02-23 PROCEDURE — 25010000002 CEFAZOLIN 1-4 GM/50ML-% SOLUTION: Performed by: INTERNAL MEDICINE

## 2022-02-23 PROCEDURE — 83735 ASSAY OF MAGNESIUM: CPT | Performed by: INTERNAL MEDICINE

## 2022-02-23 PROCEDURE — 25010000002 HEPARIN (PORCINE) PER 1000 UNITS: Performed by: INTERNAL MEDICINE

## 2022-02-23 PROCEDURE — 99232 SBSQ HOSP IP/OBS MODERATE 35: CPT | Performed by: INTERNAL MEDICINE

## 2022-02-23 RX ORDER — ALBUMIN (HUMAN) 12.5 G/50ML
12.5 SOLUTION INTRAVENOUS AS NEEDED
Status: DISPENSED | OUTPATIENT
Start: 2022-02-23 | End: 2022-02-24

## 2022-02-23 RX ORDER — POTASSIUM CHLORIDE 750 MG/1
40 CAPSULE, EXTENDED RELEASE ORAL ONCE
Status: COMPLETED | OUTPATIENT
Start: 2022-02-23 | End: 2022-02-23

## 2022-02-23 RX ADMIN — POTASSIUM CHLORIDE 40 MEQ: 10 CAPSULE, COATED, EXTENDED RELEASE ORAL at 13:05

## 2022-02-23 RX ADMIN — PANTOPRAZOLE SODIUM 40 MG: 40 TABLET, DELAYED RELEASE ORAL at 06:13

## 2022-02-23 RX ADMIN — SEVELAMER CARBONATE 800 MG: 800 TABLET, FILM COATED ORAL at 13:05

## 2022-02-23 RX ADMIN — BUPROPION HYDROCHLORIDE 100 MG: 100 TABLET, FILM COATED, EXTENDED RELEASE ORAL at 09:26

## 2022-02-23 RX ADMIN — ATORVASTATIN CALCIUM 40 MG: 40 TABLET, FILM COATED ORAL at 20:47

## 2022-02-23 RX ADMIN — HEPARIN SODIUM 5000 UNITS: 5000 INJECTION, SOLUTION INTRAVENOUS; SUBCUTANEOUS at 20:47

## 2022-02-23 RX ADMIN — SERTRALINE 50 MG: 25 TABLET, FILM COATED ORAL at 09:26

## 2022-02-23 RX ADMIN — CEFAZOLIN SODIUM 1 G: 1 INJECTION, SOLUTION INTRAVENOUS at 20:47

## 2022-02-23 RX ADMIN — Medication 1 TABLET: at 09:26

## 2022-02-23 RX ADMIN — SEVELAMER CARBONATE 800 MG: 800 TABLET, FILM COATED ORAL at 09:26

## 2022-02-23 RX ADMIN — HEPARIN SODIUM 5000 UNITS: 5000 INJECTION, SOLUTION INTRAVENOUS; SUBCUTANEOUS at 09:27

## 2022-02-23 RX ADMIN — SODIUM CHLORIDE, PRESERVATIVE FREE 10 ML: 5 INJECTION INTRAVENOUS at 09:27

## 2022-02-23 RX ADMIN — SEVELAMER CARBONATE 800 MG: 800 TABLET, FILM COATED ORAL at 17:49

## 2022-02-23 RX ADMIN — SODIUM CHLORIDE, PRESERVATIVE FREE 10 ML: 5 INJECTION INTRAVENOUS at 20:47

## 2022-02-23 RX ADMIN — ASPIRIN 81 MG: 81 TABLET, COATED ORAL at 09:27

## 2022-02-24 LAB
ANION GAP SERPL CALCULATED.3IONS-SCNC: 10.5 MMOL/L (ref 5–15)
BUN SERPL-MCNC: 27 MG/DL (ref 6–20)
BUN/CREAT SERPL: 5.8 (ref 7–25)
CALCIUM SPEC-SCNC: 9 MG/DL (ref 8.6–10.5)
CHLORIDE SERPL-SCNC: 101 MMOL/L (ref 98–107)
CO2 SERPL-SCNC: 25.5 MMOL/L (ref 22–29)
CREAT SERPL-MCNC: 4.65 MG/DL (ref 0.76–1.27)
GFR SERPL CREATININE-BSD FRML MDRD: 13 ML/MIN/1.73
GLUCOSE BLDC GLUCOMTR-MCNC: 107 MG/DL (ref 70–99)
GLUCOSE BLDC GLUCOMTR-MCNC: 144 MG/DL (ref 70–99)
GLUCOSE BLDC GLUCOMTR-MCNC: 99 MG/DL (ref 70–99)
GLUCOSE SERPL-MCNC: 112 MG/DL (ref 65–99)
POTASSIUM SERPL-SCNC: 3.8 MMOL/L (ref 3.5–5.2)
SODIUM SERPL-SCNC: 137 MMOL/L (ref 136–145)
WHOLE BLOOD HOLD SPECIMEN: NORMAL

## 2022-02-24 PROCEDURE — 25010000002 HEPARIN (PORCINE) PER 1000 UNITS: Performed by: INTERNAL MEDICINE

## 2022-02-24 PROCEDURE — 25010000002 CEFAZOLIN 1-4 GM/50ML-% SOLUTION: Performed by: INTERNAL MEDICINE

## 2022-02-24 PROCEDURE — 80048 BASIC METABOLIC PNL TOTAL CA: CPT | Performed by: INTERNAL MEDICINE

## 2022-02-24 PROCEDURE — 99232 SBSQ HOSP IP/OBS MODERATE 35: CPT | Performed by: INTERNAL MEDICINE

## 2022-02-24 PROCEDURE — 82962 GLUCOSE BLOOD TEST: CPT

## 2022-02-24 RX ADMIN — Medication: at 09:41

## 2022-02-24 RX ADMIN — SODIUM CHLORIDE, PRESERVATIVE FREE 10 ML: 5 INJECTION INTRAVENOUS at 09:41

## 2022-02-24 RX ADMIN — BUPROPION HYDROCHLORIDE 100 MG: 100 TABLET, FILM COATED, EXTENDED RELEASE ORAL at 09:41

## 2022-02-24 RX ADMIN — Medication 1 TABLET: at 09:41

## 2022-02-24 RX ADMIN — CEFAZOLIN SODIUM 1 G: 1 INJECTION, SOLUTION INTRAVENOUS at 20:19

## 2022-02-24 RX ADMIN — HEPARIN SODIUM 5000 UNITS: 5000 INJECTION, SOLUTION INTRAVENOUS; SUBCUTANEOUS at 09:40

## 2022-02-24 RX ADMIN — PANTOPRAZOLE SODIUM 40 MG: 40 TABLET, DELAYED RELEASE ORAL at 06:35

## 2022-02-24 RX ADMIN — SEVELAMER CARBONATE 800 MG: 800 TABLET, FILM COATED ORAL at 09:40

## 2022-02-24 RX ADMIN — ATORVASTATIN CALCIUM 40 MG: 40 TABLET, FILM COATED ORAL at 20:19

## 2022-02-24 RX ADMIN — SODIUM CHLORIDE, PRESERVATIVE FREE 10 ML: 5 INJECTION INTRAVENOUS at 20:19

## 2022-02-24 RX ADMIN — HEPARIN SODIUM 5000 UNITS: 5000 INJECTION, SOLUTION INTRAVENOUS; SUBCUTANEOUS at 20:19

## 2022-02-24 RX ADMIN — SERTRALINE 50 MG: 25 TABLET, FILM COATED ORAL at 09:41

## 2022-02-24 RX ADMIN — SEVELAMER CARBONATE 800 MG: 800 TABLET, FILM COATED ORAL at 17:50

## 2022-02-24 RX ADMIN — MICONAZOLE NITRATE 1 APPLICATION: 2 POWDER TOPICAL at 20:20

## 2022-02-24 RX ADMIN — ASPIRIN 81 MG: 81 TABLET, COATED ORAL at 09:41

## 2022-02-25 LAB
027 TOXIN: ABNORMAL
ANION GAP SERPL CALCULATED.3IONS-SCNC: 13 MMOL/L (ref 5–15)
BUN SERPL-MCNC: 16 MG/DL (ref 6–20)
BUN/CREAT SERPL: 5.3 (ref 7–25)
C DIFF TOX GENS STL QL NAA+PROBE: POSITIVE
CALCIUM SPEC-SCNC: 8.8 MG/DL (ref 8.6–10.5)
CHLORIDE SERPL-SCNC: 101 MMOL/L (ref 98–107)
CO2 SERPL-SCNC: 24 MMOL/L (ref 22–29)
CREAT SERPL-MCNC: 3.02 MG/DL (ref 0.76–1.27)
GFR SERPL CREATININE-BSD FRML MDRD: 21 ML/MIN/1.73
GLUCOSE BLDC GLUCOMTR-MCNC: 103 MG/DL (ref 70–99)
GLUCOSE BLDC GLUCOMTR-MCNC: 106 MG/DL (ref 70–99)
GLUCOSE BLDC GLUCOMTR-MCNC: 107 MG/DL (ref 70–99)
GLUCOSE BLDC GLUCOMTR-MCNC: 110 MG/DL (ref 70–99)
GLUCOSE BLDC GLUCOMTR-MCNC: 91 MG/DL (ref 70–99)
GLUCOSE SERPL-MCNC: 96 MG/DL (ref 65–99)
POTASSIUM SERPL-SCNC: 3.6 MMOL/L (ref 3.5–5.2)
SODIUM SERPL-SCNC: 138 MMOL/L (ref 136–145)

## 2022-02-25 PROCEDURE — 82962 GLUCOSE BLOOD TEST: CPT

## 2022-02-25 PROCEDURE — 25010000002 HEPARIN (PORCINE) PER 1000 UNITS: Performed by: INTERNAL MEDICINE

## 2022-02-25 PROCEDURE — 25010000002 CEFAZOLIN 1-4 GM/50ML-% SOLUTION: Performed by: INTERNAL MEDICINE

## 2022-02-25 PROCEDURE — 87493 C DIFF AMPLIFIED PROBE: CPT | Performed by: INTERNAL MEDICINE

## 2022-02-25 PROCEDURE — 25010000002 EPOETIN ALFA PER 1000 UNITS: Performed by: INTERNAL MEDICINE

## 2022-02-25 PROCEDURE — 80048 BASIC METABOLIC PNL TOTAL CA: CPT | Performed by: INTERNAL MEDICINE

## 2022-02-25 PROCEDURE — 99232 SBSQ HOSP IP/OBS MODERATE 35: CPT | Performed by: INTERNAL MEDICINE

## 2022-02-25 PROCEDURE — 97110 THERAPEUTIC EXERCISES: CPT

## 2022-02-25 RX ORDER — VANCOMYCIN HYDROCHLORIDE 125 MG/1
125 CAPSULE ORAL EVERY 6 HOURS SCHEDULED
Status: DISCONTINUED | OUTPATIENT
Start: 2022-02-25 | End: 2022-03-01 | Stop reason: HOSPADM

## 2022-02-25 RX ORDER — ALBUMIN (HUMAN) 12.5 G/50ML
12.5 SOLUTION INTRAVENOUS AS NEEDED
Status: DISPENSED | OUTPATIENT
Start: 2022-02-25 | End: 2022-02-26

## 2022-02-25 RX ORDER — CALCIUM CARBONATE 200(500)MG
2 TABLET,CHEWABLE ORAL EVERY 6 HOURS PRN
Status: DISCONTINUED | OUTPATIENT
Start: 2022-02-25 | End: 2022-03-01 | Stop reason: HOSPADM

## 2022-02-25 RX ORDER — MIRTAZAPINE 15 MG/1
7.5 TABLET, FILM COATED ORAL NIGHTLY
Status: DISCONTINUED | OUTPATIENT
Start: 2022-02-25 | End: 2022-02-25

## 2022-02-25 RX ORDER — FAMOTIDINE 10 MG
10 TABLET ORAL DAILY
Status: DISCONTINUED | OUTPATIENT
Start: 2022-02-26 | End: 2022-03-01 | Stop reason: HOSPADM

## 2022-02-25 RX ADMIN — SEVELAMER CARBONATE 800 MG: 800 TABLET, FILM COATED ORAL at 13:06

## 2022-02-25 RX ADMIN — MICONAZOLE NITRATE: 2 POWDER TOPICAL at 09:03

## 2022-02-25 RX ADMIN — HEPARIN SODIUM 5000 UNITS: 5000 INJECTION, SOLUTION INTRAVENOUS; SUBCUTANEOUS at 09:01

## 2022-02-25 RX ADMIN — SEVELAMER CARBONATE 800 MG: 800 TABLET, FILM COATED ORAL at 09:00

## 2022-02-25 RX ADMIN — ATORVASTATIN CALCIUM 40 MG: 40 TABLET, FILM COATED ORAL at 21:50

## 2022-02-25 RX ADMIN — CEFAZOLIN SODIUM 1 G: 1 INJECTION, SOLUTION INTRAVENOUS at 21:50

## 2022-02-25 RX ADMIN — SODIUM CHLORIDE, PRESERVATIVE FREE 10 ML: 5 INJECTION INTRAVENOUS at 09:03

## 2022-02-25 RX ADMIN — PANTOPRAZOLE SODIUM 40 MG: 40 TABLET, DELAYED RELEASE ORAL at 06:25

## 2022-02-25 RX ADMIN — ASPIRIN 81 MG: 81 TABLET, COATED ORAL at 09:00

## 2022-02-25 RX ADMIN — VANCOMYCIN HYDROCHLORIDE 125 MG: 125 CAPSULE ORAL at 23:50

## 2022-02-25 RX ADMIN — HEPARIN SODIUM 5000 UNITS: 5000 INJECTION, SOLUTION INTRAVENOUS; SUBCUTANEOUS at 21:50

## 2022-02-25 RX ADMIN — SERTRALINE 50 MG: 25 TABLET, FILM COATED ORAL at 09:00

## 2022-02-25 RX ADMIN — Medication 1 TABLET: at 09:01

## 2022-02-25 RX ADMIN — SEVELAMER CARBONATE 800 MG: 800 TABLET, FILM COATED ORAL at 18:05

## 2022-02-25 RX ADMIN — BUPROPION HYDROCHLORIDE 100 MG: 100 TABLET, FILM COATED, EXTENDED RELEASE ORAL at 09:00

## 2022-02-25 RX ADMIN — SODIUM CHLORIDE, PRESERVATIVE FREE 10 ML: 5 INJECTION INTRAVENOUS at 21:51

## 2022-02-25 RX ADMIN — VANCOMYCIN HYDROCHLORIDE 125 MG: 125 CAPSULE ORAL at 18:05

## 2022-02-25 RX ADMIN — EPOETIN ALFA 6000 UNITS: 3000 SOLUTION INTRAVENOUS; SUBCUTANEOUS at 09:01

## 2022-02-25 RX ADMIN — MICONAZOLE NITRATE: 2 POWDER TOPICAL at 21:51

## 2022-02-26 LAB
ALBUMIN SERPL-MCNC: 3.1 G/DL (ref 3.5–5.2)
ALBUMIN/GLOB SERPL: 1.1 G/DL
ALP SERPL-CCNC: 100 U/L (ref 39–117)
ALT SERPL W P-5'-P-CCNC: <5 U/L (ref 1–41)
ANION GAP SERPL CALCULATED.3IONS-SCNC: 12.9 MMOL/L (ref 5–15)
AST SERPL-CCNC: 13 U/L (ref 1–40)
BILIRUB SERPL-MCNC: 0.2 MG/DL (ref 0–1.2)
BUN SERPL-MCNC: 23 MG/DL (ref 6–20)
BUN/CREAT SERPL: 5.8 (ref 7–25)
CALCIUM SPEC-SCNC: 9.3 MG/DL (ref 8.6–10.5)
CHLORIDE SERPL-SCNC: 101 MMOL/L (ref 98–107)
CO2 SERPL-SCNC: 26.1 MMOL/L (ref 22–29)
CREAT SERPL-MCNC: 3.94 MG/DL (ref 0.76–1.27)
DEPRECATED RDW RBC AUTO: 62 FL (ref 37–54)
ERYTHROCYTE [DISTWIDTH] IN BLOOD BY AUTOMATED COUNT: 17.2 % (ref 12.3–15.4)
GFR SERPL CREATININE-BSD FRML MDRD: 16 ML/MIN/1.73
GLOBULIN UR ELPH-MCNC: 2.9 GM/DL
GLUCOSE BLDC GLUCOMTR-MCNC: 101 MG/DL (ref 70–99)
GLUCOSE BLDC GLUCOMTR-MCNC: 123 MG/DL (ref 70–99)
GLUCOSE BLDC GLUCOMTR-MCNC: 142 MG/DL (ref 70–99)
GLUCOSE SERPL-MCNC: 111 MG/DL (ref 65–99)
HCT VFR BLD AUTO: 40.8 % (ref 37.5–51)
HGB BLD-MCNC: 12.3 G/DL (ref 13–17.7)
MCH RBC QN AUTO: 30.2 PG (ref 26.6–33)
MCHC RBC AUTO-ENTMCNC: 30.1 G/DL (ref 31.5–35.7)
MCV RBC AUTO: 100.2 FL (ref 79–97)
PLATELET # BLD AUTO: 259 10*3/MM3 (ref 140–450)
PMV BLD AUTO: 10.3 FL (ref 6–12)
POTASSIUM SERPL-SCNC: 3.5 MMOL/L (ref 3.5–5.2)
PROT SERPL-MCNC: 6 G/DL (ref 6–8.5)
RBC # BLD AUTO: 4.07 10*6/MM3 (ref 4.14–5.8)
SODIUM SERPL-SCNC: 140 MMOL/L (ref 136–145)
WBC NRBC COR # BLD: 7.59 10*3/MM3 (ref 3.4–10.8)

## 2022-02-26 PROCEDURE — 85027 COMPLETE CBC AUTOMATED: CPT | Performed by: INTERNAL MEDICINE

## 2022-02-26 PROCEDURE — 25010000002 HEPARIN (PORCINE) PER 1000 UNITS: Performed by: INTERNAL MEDICINE

## 2022-02-26 PROCEDURE — 82962 GLUCOSE BLOOD TEST: CPT

## 2022-02-26 PROCEDURE — 80053 COMPREHEN METABOLIC PANEL: CPT | Performed by: INTERNAL MEDICINE

## 2022-02-26 PROCEDURE — 99232 SBSQ HOSP IP/OBS MODERATE 35: CPT | Performed by: INTERNAL MEDICINE

## 2022-02-26 PROCEDURE — 25010000002 CEFAZOLIN 1-4 GM/50ML-% SOLUTION: Performed by: INTERNAL MEDICINE

## 2022-02-26 RX ADMIN — VANCOMYCIN HYDROCHLORIDE 125 MG: 125 CAPSULE ORAL at 05:19

## 2022-02-26 RX ADMIN — ASPIRIN 81 MG: 81 TABLET, COATED ORAL at 11:20

## 2022-02-26 RX ADMIN — CEFAZOLIN SODIUM 1 G: 1 INJECTION, SOLUTION INTRAVENOUS at 21:25

## 2022-02-26 RX ADMIN — HEPARIN SODIUM 5000 UNITS: 5000 INJECTION, SOLUTION INTRAVENOUS; SUBCUTANEOUS at 11:25

## 2022-02-26 RX ADMIN — Medication 1 TABLET: at 11:25

## 2022-02-26 RX ADMIN — FAMOTIDINE 10 MG: 10 TABLET ORAL at 11:24

## 2022-02-26 RX ADMIN — ATORVASTATIN CALCIUM 40 MG: 40 TABLET, FILM COATED ORAL at 21:25

## 2022-02-26 RX ADMIN — SEVELAMER CARBONATE 800 MG: 800 TABLET, FILM COATED ORAL at 08:11

## 2022-02-26 RX ADMIN — MICONAZOLE NITRATE: 2 POWDER TOPICAL at 11:25

## 2022-02-26 RX ADMIN — BUPROPION HYDROCHLORIDE 100 MG: 100 TABLET, FILM COATED, EXTENDED RELEASE ORAL at 11:24

## 2022-02-26 RX ADMIN — SEVELAMER CARBONATE 800 MG: 800 TABLET, FILM COATED ORAL at 14:43

## 2022-02-26 RX ADMIN — SERTRALINE 50 MG: 25 TABLET, FILM COATED ORAL at 11:28

## 2022-02-26 RX ADMIN — SEVELAMER CARBONATE 800 MG: 800 TABLET, FILM COATED ORAL at 17:57

## 2022-02-26 RX ADMIN — VANCOMYCIN HYDROCHLORIDE 125 MG: 125 CAPSULE ORAL at 17:57

## 2022-02-26 RX ADMIN — VANCOMYCIN HYDROCHLORIDE 125 MG: 125 CAPSULE ORAL at 14:43

## 2022-02-26 RX ADMIN — MICONAZOLE NITRATE: 2 POWDER TOPICAL at 21:25

## 2022-02-26 RX ADMIN — SODIUM CHLORIDE, PRESERVATIVE FREE 10 ML: 5 INJECTION INTRAVENOUS at 21:26

## 2022-02-26 RX ADMIN — SODIUM CHLORIDE, PRESERVATIVE FREE 10 ML: 5 INJECTION INTRAVENOUS at 11:28

## 2022-02-26 RX ADMIN — HEPARIN SODIUM 5000 UNITS: 5000 INJECTION, SOLUTION INTRAVENOUS; SUBCUTANEOUS at 21:25

## 2022-02-27 LAB
ALBUMIN SERPL-MCNC: 2.7 G/DL (ref 3.5–5.2)
ANION GAP SERPL CALCULATED.3IONS-SCNC: 17.2 MMOL/L (ref 5–15)
BASOPHILS # BLD AUTO: 0.17 10*3/MM3 (ref 0–0.2)
BASOPHILS NFR BLD AUTO: 1.8 % (ref 0–1.5)
BUN SERPL-MCNC: 15 MG/DL (ref 6–20)
BUN/CREAT SERPL: 4.7 (ref 7–25)
CALCIUM SPEC-SCNC: 8.9 MG/DL (ref 8.6–10.5)
CHLORIDE SERPL-SCNC: 99 MMOL/L (ref 98–107)
CO2 SERPL-SCNC: 18.8 MMOL/L (ref 22–29)
CREAT SERPL-MCNC: 3.17 MG/DL (ref 0.76–1.27)
DEPRECATED RDW RBC AUTO: 63.1 FL (ref 37–54)
EOSINOPHIL # BLD AUTO: 0.01 10*3/MM3 (ref 0–0.4)
EOSINOPHIL NFR BLD AUTO: 0.1 % (ref 0.3–6.2)
ERYTHROCYTE [DISTWIDTH] IN BLOOD BY AUTOMATED COUNT: 17.2 % (ref 12.3–15.4)
GFR SERPL CREATININE-BSD FRML MDRD: 20 ML/MIN/1.73
GLUCOSE BLDC GLUCOMTR-MCNC: 116 MG/DL (ref 70–99)
GLUCOSE BLDC GLUCOMTR-MCNC: 121 MG/DL (ref 70–99)
GLUCOSE BLDC GLUCOMTR-MCNC: 95 MG/DL (ref 70–99)
GLUCOSE BLDC GLUCOMTR-MCNC: 99 MG/DL (ref 70–99)
GLUCOSE SERPL-MCNC: 107 MG/DL (ref 65–99)
HCT VFR BLD AUTO: 42.5 % (ref 37.5–51)
HGB BLD-MCNC: 12.7 G/DL (ref 13–17.7)
IMM GRANULOCYTES # BLD AUTO: 0.04 10*3/MM3 (ref 0–0.05)
IMM GRANULOCYTES NFR BLD AUTO: 0.4 % (ref 0–0.5)
LYMPHOCYTES # BLD AUTO: 1.4 10*3/MM3 (ref 0.7–3.1)
LYMPHOCYTES NFR BLD AUTO: 15 % (ref 19.6–45.3)
MCH RBC QN AUTO: 30.2 PG (ref 26.6–33)
MCHC RBC AUTO-ENTMCNC: 29.9 G/DL (ref 31.5–35.7)
MCV RBC AUTO: 101 FL (ref 79–97)
MONOCYTES # BLD AUTO: 1.15 10*3/MM3 (ref 0.1–0.9)
MONOCYTES NFR BLD AUTO: 12.3 % (ref 5–12)
NEUTROPHILS NFR BLD AUTO: 6.56 10*3/MM3 (ref 1.7–7)
NEUTROPHILS NFR BLD AUTO: 70.4 % (ref 42.7–76)
NRBC BLD AUTO-RTO: 0 /100 WBC (ref 0–0.2)
PHOSPHATE SERPL-MCNC: 2.3 MG/DL (ref 2.5–4.5)
PLATELET # BLD AUTO: 217 10*3/MM3 (ref 140–450)
PMV BLD AUTO: 10 FL (ref 6–12)
POTASSIUM SERPL-SCNC: 4.8 MMOL/L (ref 3.5–5.2)
RBC # BLD AUTO: 4.21 10*6/MM3 (ref 4.14–5.8)
SODIUM SERPL-SCNC: 135 MMOL/L (ref 136–145)
WBC NRBC COR # BLD: 9.33 10*3/MM3 (ref 3.4–10.8)

## 2022-02-27 PROCEDURE — 25010000002 CEFAZOLIN 1-4 GM/50ML-% SOLUTION: Performed by: INTERNAL MEDICINE

## 2022-02-27 PROCEDURE — 25010000002 HEPARIN (PORCINE) PER 1000 UNITS: Performed by: INTERNAL MEDICINE

## 2022-02-27 PROCEDURE — 82962 GLUCOSE BLOOD TEST: CPT

## 2022-02-27 PROCEDURE — 80069 RENAL FUNCTION PANEL: CPT | Performed by: INTERNAL MEDICINE

## 2022-02-27 PROCEDURE — 85025 COMPLETE CBC W/AUTO DIFF WBC: CPT | Performed by: INTERNAL MEDICINE

## 2022-02-27 PROCEDURE — 99232 SBSQ HOSP IP/OBS MODERATE 35: CPT | Performed by: INTERNAL MEDICINE

## 2022-02-27 RX ORDER — SACCHAROMYCES BOULARDII 250 MG
250 CAPSULE ORAL 2 TIMES DAILY
Status: DISCONTINUED | OUTPATIENT
Start: 2022-02-27 | End: 2022-03-01 | Stop reason: HOSPADM

## 2022-02-27 RX ADMIN — SERTRALINE 50 MG: 25 TABLET, FILM COATED ORAL at 10:00

## 2022-02-27 RX ADMIN — Medication 1 TABLET: at 10:00

## 2022-02-27 RX ADMIN — SEVELAMER CARBONATE 800 MG: 800 TABLET, FILM COATED ORAL at 07:51

## 2022-02-27 RX ADMIN — SODIUM CHLORIDE, PRESERVATIVE FREE 10 ML: 5 INJECTION INTRAVENOUS at 20:44

## 2022-02-27 RX ADMIN — VANCOMYCIN HYDROCHLORIDE 125 MG: 125 CAPSULE ORAL at 13:01

## 2022-02-27 RX ADMIN — RDII 250 MG CAPSULE 250 MG: at 20:42

## 2022-02-27 RX ADMIN — CEFAZOLIN SODIUM 1 G: 1 INJECTION, SOLUTION INTRAVENOUS at 20:43

## 2022-02-27 RX ADMIN — SODIUM CHLORIDE, PRESERVATIVE FREE 10 ML: 5 INJECTION INTRAVENOUS at 10:00

## 2022-02-27 RX ADMIN — RDII 250 MG CAPSULE 250 MG: at 10:00

## 2022-02-27 RX ADMIN — HEPARIN SODIUM 5000 UNITS: 5000 INJECTION, SOLUTION INTRAVENOUS; SUBCUTANEOUS at 20:43

## 2022-02-27 RX ADMIN — HEPARIN SODIUM 5000 UNITS: 5000 INJECTION, SOLUTION INTRAVENOUS; SUBCUTANEOUS at 10:00

## 2022-02-27 RX ADMIN — ATORVASTATIN CALCIUM 40 MG: 40 TABLET, FILM COATED ORAL at 20:42

## 2022-02-27 RX ADMIN — SEVELAMER CARBONATE 800 MG: 800 TABLET, FILM COATED ORAL at 13:00

## 2022-02-27 RX ADMIN — FAMOTIDINE 10 MG: 10 TABLET ORAL at 10:00

## 2022-02-27 RX ADMIN — VANCOMYCIN HYDROCHLORIDE 125 MG: 125 CAPSULE ORAL at 00:23

## 2022-02-27 RX ADMIN — BUPROPION HYDROCHLORIDE 100 MG: 100 TABLET, FILM COATED, EXTENDED RELEASE ORAL at 10:00

## 2022-02-27 RX ADMIN — SEVELAMER CARBONATE 800 MG: 800 TABLET, FILM COATED ORAL at 18:15

## 2022-02-27 RX ADMIN — VANCOMYCIN HYDROCHLORIDE 125 MG: 125 CAPSULE ORAL at 18:15

## 2022-02-27 RX ADMIN — ASPIRIN 81 MG: 81 TABLET, COATED ORAL at 10:00

## 2022-02-27 RX ADMIN — MICONAZOLE NITRATE: 2 POWDER TOPICAL at 20:43

## 2022-02-27 RX ADMIN — MICONAZOLE NITRATE: 2 POWDER TOPICAL at 10:00

## 2022-02-27 RX ADMIN — VANCOMYCIN HYDROCHLORIDE 125 MG: 125 CAPSULE ORAL at 06:26

## 2022-02-28 LAB
ALBUMIN SERPL-MCNC: 2.9 G/DL (ref 3.5–5.2)
ANION GAP SERPL CALCULATED.3IONS-SCNC: 17 MMOL/L (ref 5–15)
BUN SERPL-MCNC: 22 MG/DL (ref 6–20)
BUN/CREAT SERPL: 5.2 (ref 7–25)
CALCIUM SPEC-SCNC: 9 MG/DL (ref 8.6–10.5)
CHLORIDE SERPL-SCNC: 98 MMOL/L (ref 98–107)
CO2 SERPL-SCNC: 19 MMOL/L (ref 22–29)
CREAT SERPL-MCNC: 4.2 MG/DL (ref 0.76–1.27)
GFR SERPL CREATININE-BSD FRML MDRD: 15 ML/MIN/1.73
GLUCOSE BLDC GLUCOMTR-MCNC: 100 MG/DL (ref 70–99)
GLUCOSE BLDC GLUCOMTR-MCNC: 111 MG/DL (ref 70–99)
GLUCOSE BLDC GLUCOMTR-MCNC: 154 MG/DL (ref 70–99)
GLUCOSE BLDC GLUCOMTR-MCNC: 85 MG/DL (ref 70–99)
GLUCOSE SERPL-MCNC: 103 MG/DL (ref 65–99)
PHOSPHATE SERPL-MCNC: 3.5 MG/DL (ref 2.5–4.5)
POTASSIUM SERPL-SCNC: 4.3 MMOL/L (ref 3.5–5.2)
SODIUM SERPL-SCNC: 134 MMOL/L (ref 136–145)

## 2022-02-28 PROCEDURE — 25010000002 HEPARIN (PORCINE) PER 1000 UNITS: Performed by: INTERNAL MEDICINE

## 2022-02-28 PROCEDURE — 80069 RENAL FUNCTION PANEL: CPT | Performed by: INTERNAL MEDICINE

## 2022-02-28 PROCEDURE — 25010000002 CEFAZOLIN 1-4 GM/50ML-% SOLUTION: Performed by: INTERNAL MEDICINE

## 2022-02-28 PROCEDURE — 25010000002 EPOETIN ALFA PER 1000 UNITS: Performed by: INTERNAL MEDICINE

## 2022-02-28 PROCEDURE — 97164 PT RE-EVAL EST PLAN CARE: CPT

## 2022-02-28 PROCEDURE — 82962 GLUCOSE BLOOD TEST: CPT

## 2022-02-28 PROCEDURE — 99232 SBSQ HOSP IP/OBS MODERATE 35: CPT | Performed by: INTERNAL MEDICINE

## 2022-02-28 RX ADMIN — SEVELAMER CARBONATE 800 MG: 800 TABLET, FILM COATED ORAL at 17:18

## 2022-02-28 RX ADMIN — FAMOTIDINE 10 MG: 10 TABLET ORAL at 09:42

## 2022-02-28 RX ADMIN — MICONAZOLE NITRATE: 2 POWDER TOPICAL at 21:32

## 2022-02-28 RX ADMIN — CEFAZOLIN SODIUM 1 G: 1 INJECTION, SOLUTION INTRAVENOUS at 21:31

## 2022-02-28 RX ADMIN — Medication: at 10:02

## 2022-02-28 RX ADMIN — SODIUM CHLORIDE, PRESERVATIVE FREE 10 ML: 5 INJECTION INTRAVENOUS at 10:01

## 2022-02-28 RX ADMIN — EPOETIN ALFA 6000 UNITS: 3000 SOLUTION INTRAVENOUS; SUBCUTANEOUS at 09:40

## 2022-02-28 RX ADMIN — ASPIRIN 81 MG: 81 TABLET, COATED ORAL at 09:42

## 2022-02-28 RX ADMIN — HEPARIN SODIUM 5000 UNITS: 5000 INJECTION, SOLUTION INTRAVENOUS; SUBCUTANEOUS at 09:42

## 2022-02-28 RX ADMIN — MICONAZOLE NITRATE: 2 POWDER TOPICAL at 09:42

## 2022-02-28 RX ADMIN — RDII 250 MG CAPSULE 250 MG: at 21:31

## 2022-02-28 RX ADMIN — SODIUM CHLORIDE, PRESERVATIVE FREE 10 ML: 5 INJECTION INTRAVENOUS at 21:31

## 2022-02-28 RX ADMIN — Medication 1 TABLET: at 09:42

## 2022-02-28 RX ADMIN — SEVELAMER CARBONATE 800 MG: 800 TABLET, FILM COATED ORAL at 11:31

## 2022-02-28 RX ADMIN — SEVELAMER CARBONATE 800 MG: 800 TABLET, FILM COATED ORAL at 09:41

## 2022-02-28 RX ADMIN — ATORVASTATIN CALCIUM 40 MG: 40 TABLET, FILM COATED ORAL at 21:31

## 2022-02-28 RX ADMIN — BUPROPION HYDROCHLORIDE 100 MG: 100 TABLET, FILM COATED, EXTENDED RELEASE ORAL at 09:42

## 2022-02-28 RX ADMIN — RDII 250 MG CAPSULE 250 MG: at 09:41

## 2022-02-28 RX ADMIN — SERTRALINE 50 MG: 25 TABLET, FILM COATED ORAL at 09:41

## 2022-02-28 RX ADMIN — HEPARIN SODIUM 5000 UNITS: 5000 INJECTION, SOLUTION INTRAVENOUS; SUBCUTANEOUS at 21:31

## 2022-03-01 VITALS
HEART RATE: 102 BPM | WEIGHT: 212.74 LBS | TEMPERATURE: 98 F | BODY MASS INDEX: 30.46 KG/M2 | OXYGEN SATURATION: 90 % | RESPIRATION RATE: 18 BRPM | HEIGHT: 70 IN | DIASTOLIC BLOOD PRESSURE: 64 MMHG | SYSTOLIC BLOOD PRESSURE: 116 MMHG

## 2022-03-01 PROBLEM — E43 SEVERE MALNUTRITION (HCC): Status: ACTIVE | Noted: 2022-03-01

## 2022-03-01 LAB
ALBUMIN SERPL-MCNC: 3.1 G/DL (ref 3.5–5.2)
ANION GAP SERPL CALCULATED.3IONS-SCNC: 17.2 MMOL/L (ref 5–15)
BASOPHILS # BLD AUTO: 0.14 10*3/MM3 (ref 0–0.2)
BASOPHILS NFR BLD AUTO: 1.6 % (ref 0–1.5)
BUN SERPL-MCNC: 31 MG/DL (ref 6–20)
BUN/CREAT SERPL: 5.7 (ref 7–25)
CALCIUM SPEC-SCNC: 9.4 MG/DL (ref 8.6–10.5)
CHLORIDE SERPL-SCNC: 101 MMOL/L (ref 98–107)
CO2 SERPL-SCNC: 21.8 MMOL/L (ref 22–29)
CREAT SERPL-MCNC: 5.43 MG/DL (ref 0.76–1.27)
DEPRECATED RDW RBC AUTO: 62.6 FL (ref 37–54)
EGFRCR SERPLBLD CKD-EPI 2021: 11.4 ML/MIN/1.73
EOSINOPHIL # BLD AUTO: 0.03 10*3/MM3 (ref 0–0.4)
EOSINOPHIL NFR BLD AUTO: 0.4 % (ref 0.3–6.2)
ERYTHROCYTE [DISTWIDTH] IN BLOOD BY AUTOMATED COUNT: 17.2 % (ref 12.3–15.4)
GLUCOSE BLDC GLUCOMTR-MCNC: 112 MG/DL (ref 70–99)
GLUCOSE BLDC GLUCOMTR-MCNC: 119 MG/DL (ref 70–99)
GLUCOSE BLDC GLUCOMTR-MCNC: 129 MG/DL (ref 70–99)
GLUCOSE SERPL-MCNC: 117 MG/DL (ref 65–99)
HBV SURFACE AG SERPL QL IA: NORMAL
HCT VFR BLD AUTO: 41.2 % (ref 37.5–51)
HGB BLD-MCNC: 12.1 G/DL (ref 13–17.7)
IMM GRANULOCYTES # BLD AUTO: 0.03 10*3/MM3 (ref 0–0.05)
IMM GRANULOCYTES NFR BLD AUTO: 0.4 % (ref 0–0.5)
LYMPHOCYTES # BLD AUTO: 1.28 10*3/MM3 (ref 0.7–3.1)
LYMPHOCYTES NFR BLD AUTO: 14.9 % (ref 19.6–45.3)
MCH RBC QN AUTO: 29.3 PG (ref 26.6–33)
MCHC RBC AUTO-ENTMCNC: 29.4 G/DL (ref 31.5–35.7)
MCV RBC AUTO: 99.8 FL (ref 79–97)
MONOCYTES # BLD AUTO: 0.77 10*3/MM3 (ref 0.1–0.9)
MONOCYTES NFR BLD AUTO: 9 % (ref 5–12)
NEUTROPHILS NFR BLD AUTO: 6.32 10*3/MM3 (ref 1.7–7)
NEUTROPHILS NFR BLD AUTO: 73.7 % (ref 42.7–76)
NRBC BLD AUTO-RTO: 0 /100 WBC (ref 0–0.2)
PHOSPHATE SERPL-MCNC: 4.1 MG/DL (ref 2.5–4.5)
PLATELET # BLD AUTO: 255 10*3/MM3 (ref 140–450)
PMV BLD AUTO: 9.8 FL (ref 6–12)
POTASSIUM SERPL-SCNC: 3.9 MMOL/L (ref 3.5–5.2)
RBC # BLD AUTO: 4.13 10*6/MM3 (ref 4.14–5.8)
SARS-COV-2 RNA PNL SPEC NAA+PROBE: NOT DETECTED
SODIUM SERPL-SCNC: 140 MMOL/L (ref 136–145)
WBC NRBC COR # BLD: 8.57 10*3/MM3 (ref 3.4–10.8)

## 2022-03-01 PROCEDURE — 25010000002 ONDANSETRON PER 1 MG: Performed by: INTERNAL MEDICINE

## 2022-03-01 PROCEDURE — 87340 HEPATITIS B SURFACE AG IA: CPT | Performed by: STUDENT IN AN ORGANIZED HEALTH CARE EDUCATION/TRAINING PROGRAM

## 2022-03-01 PROCEDURE — 97110 THERAPEUTIC EXERCISES: CPT

## 2022-03-01 PROCEDURE — 85025 COMPLETE CBC W/AUTO DIFF WBC: CPT | Performed by: INTERNAL MEDICINE

## 2022-03-01 PROCEDURE — U0004 COV-19 TEST NON-CDC HGH THRU: HCPCS | Performed by: STUDENT IN AN ORGANIZED HEALTH CARE EDUCATION/TRAINING PROGRAM

## 2022-03-01 PROCEDURE — 99239 HOSP IP/OBS DSCHRG MGMT >30: CPT | Performed by: STUDENT IN AN ORGANIZED HEALTH CARE EDUCATION/TRAINING PROGRAM

## 2022-03-01 PROCEDURE — 80069 RENAL FUNCTION PANEL: CPT | Performed by: INTERNAL MEDICINE

## 2022-03-01 PROCEDURE — 25010000002 HEPARIN (PORCINE) PER 1000 UNITS: Performed by: INTERNAL MEDICINE

## 2022-03-01 PROCEDURE — 82962 GLUCOSE BLOOD TEST: CPT

## 2022-03-01 RX ORDER — SACCHAROMYCES BOULARDII 250 MG
250 CAPSULE ORAL 2 TIMES DAILY
Qty: 60 CAPSULE | Refills: 0 | Status: SHIPPED | OUTPATIENT
Start: 2022-03-01 | End: 2022-03-31

## 2022-03-01 RX ORDER — FAMOTIDINE 10 MG
10 TABLET ORAL DAILY
Qty: 30 TABLET | Refills: 0 | Status: SHIPPED | OUTPATIENT
Start: 2022-03-02 | End: 2022-04-01

## 2022-03-01 RX ORDER — ASPIRIN 81 MG/1
81 TABLET ORAL DAILY
Qty: 30 TABLET | Refills: 0 | Status: SHIPPED | OUTPATIENT
Start: 2022-03-02 | End: 2022-04-01

## 2022-03-01 RX ORDER — BUPROPION HYDROCHLORIDE 100 MG/1
100 TABLET, EXTENDED RELEASE ORAL DAILY
Qty: 30 TABLET | Refills: 0 | Status: SHIPPED | OUTPATIENT
Start: 2022-03-02 | End: 2022-04-01

## 2022-03-01 RX ORDER — CEFAZOLIN SODIUM 1 G/50ML
1 INJECTION, SOLUTION INTRAVENOUS EVERY 24 HOURS
Qty: 150 ML | Refills: 0 | Status: SHIPPED | OUTPATIENT
Start: 2022-03-01 | End: 2022-03-04

## 2022-03-01 RX ORDER — VANCOMYCIN HYDROCHLORIDE 125 MG/1
125 CAPSULE ORAL EVERY 6 HOURS SCHEDULED
Qty: 29 CAPSULE | Refills: 0 | Status: SHIPPED | OUTPATIENT
Start: 2022-03-01 | End: 2022-03-09

## 2022-03-01 RX ORDER — CALCIUM CARBONATE 200(500)MG
2 TABLET,CHEWABLE ORAL EVERY 6 HOURS PRN
Qty: 30 TABLET | Refills: 0 | Status: SHIPPED | OUTPATIENT
Start: 2022-03-01 | End: 2022-03-31

## 2022-03-01 RX ORDER — MULTIPLE VITAMINS W/ MINERALS TAB 9MG-400MCG
1 TAB ORAL DAILY
Qty: 30 TABLET | Refills: 0 | Status: SHIPPED | OUTPATIENT
Start: 2022-03-02 | End: 2022-04-01

## 2022-03-01 RX ADMIN — RDII 250 MG CAPSULE 250 MG: at 08:29

## 2022-03-01 RX ADMIN — VANCOMYCIN HYDROCHLORIDE 125 MG: 125 CAPSULE ORAL at 12:57

## 2022-03-01 RX ADMIN — ONDANSETRON 4 MG: 2 INJECTION INTRAMUSCULAR; INTRAVENOUS at 05:54

## 2022-03-01 RX ADMIN — ONDANSETRON 4 MG: 2 INJECTION INTRAMUSCULAR; INTRAVENOUS at 12:57

## 2022-03-01 RX ADMIN — BUPROPION HYDROCHLORIDE 100 MG: 100 TABLET, FILM COATED, EXTENDED RELEASE ORAL at 08:29

## 2022-03-01 RX ADMIN — Medication: at 08:30

## 2022-03-01 RX ADMIN — SODIUM CHLORIDE, PRESERVATIVE FREE 10 ML: 5 INJECTION INTRAVENOUS at 08:31

## 2022-03-01 RX ADMIN — FAMOTIDINE 10 MG: 10 TABLET ORAL at 08:29

## 2022-03-01 RX ADMIN — Medication 1 TABLET: at 08:29

## 2022-03-01 RX ADMIN — SERTRALINE 50 MG: 25 TABLET, FILM COATED ORAL at 08:29

## 2022-03-01 RX ADMIN — ASPIRIN 81 MG: 81 TABLET, COATED ORAL at 08:29

## 2022-03-01 RX ADMIN — MICONAZOLE NITRATE: 2 POWDER TOPICAL at 08:28

## 2022-03-01 RX ADMIN — VANCOMYCIN HYDROCHLORIDE 125 MG: 125 CAPSULE ORAL at 05:54

## 2022-03-01 RX ADMIN — HEPARIN SODIUM 5000 UNITS: 5000 INJECTION, SOLUTION INTRAVENOUS; SUBCUTANEOUS at 08:29

## 2022-03-01 NOTE — SIGNIFICANT NOTE
03/01/22 1504   Plan   Final Discharge Disposition Code 03 - skilled nursing facility (SNF)   Final Note Patient being discharged today to [facility] in [place], [place].

## 2022-03-01 NOTE — PLAN OF CARE
Goal Outcome Evaluation:         Patient states he was able to consume the Boost Glucose Control x 1 yesterday.  Has one available on his tray today.  Encouraged patient to continue drinking oral nutrition supplements.      Continues to refuse meals.  Not agreeable to short term enteral nutrition, although this would be appropriate.  Patient has not eaten x 13 days.  Oral nutrition supplements provide 570 kcal, 48 g pro per day, which is not adequate to meet estimated needs.

## 2022-03-01 NOTE — SIGNIFICANT NOTE
03/01/22 0850   Plan   Plan Comments Patient has a chairtime set for [facility] for tomorrow at 12:15 pm. Facility has been made aware. [facility] requested a Hep B antigen prior to discharge today. Covid test was never ordered yesterday. JORGE notified MD to order one. Pt cannot admit to [facility] until we have a negative covid test. JORGE will continue to follow.   Final Discharge Disposition Code 03 - skilled nursing facility (SNF)
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
## 2022-03-01 NOTE — THERAPY TREATMENT NOTE
Acute Care - Physical Therapy Treatment Note  IRMA Huggins     Patient Name: Min Trejo  : 1960  MRN: [id number]  Today's Date: 3/1/2022      Visit Dx:     ICD-10-CM ICD-9-CM   1. Altered mental status, unspecified altered mental status type  R41.82 780.97   2. Decreased activities of daily living (ADL)  Z78.9 V49.89   3. Difficulty walking  R26.2 719.7     Patient Active Problem List   Diagnosis   • CKD (chronic kidney disease) stage 4, GFR 15-29 ml/min ([facility])   • Type 2 diabetes mellitus with hyperglycemia, with long-term current use of insulin ([facility])   • History of smoking 30 or more pack years   • Essential hypertension   • Acute hypoxemic respiratory failure due to COVID-19 ([facility])   • Cytokine release syndrome, grade 2   • Nicotine use   • Hyperlipemia, mixed   • Coronary artery disease involving native coronary artery of native heart without angina pectoris   • History of coronary angioplasty with insertion of stent   • Hx of CABG   • Abnormal foot pulse   • Abnormal PFTs (pulmonary function tests)   • Allergy, unspecified, initial encounter   • Anemia in chronic kidney disease   • Arthritis   • Bulge of lumbar disc without myelopathy   • Lumbar radiculopathy   • Cardiomyopathy, ischemic   • Chronic systolic CHF (congestive heart failure) ([facility])   • Class 2 severe obesity due to excess calories with serious comorbidity and body mass index (BMI) of 39.0 to 39.9 in adult ([facility])   • Coagulation defect, unspecified ([facility])   • COVID-19   • Dependence on renal dialysis ([facility])   • Encounter for screening for respiratory tuberculosis   • Encounter for immunization   • ESRD (end stage renal disease) ([facility])   • Limb swelling   • Gastro-esophageal reflux disease without esophagitis   • History of heart attack   • Infection with methicillin-resistant Staphylococcus aureus   • Iron deficiency anemia   • Left leg weakness   • Neuropathy   • Nicotine addiction   • Noncompliance   • Open wound of buttock   • Other  disorders of phosphorus metabolism   • Renal failure   • Retinopathy   • Secondary hyperparathyroidism of renal origin (HCC)   • Shortness of breath   • Unspecified protein-calorie malnutrition (HCC)   • Ventricular thrombus following MI (myocardial infarction) (HCC)   • PVD (peripheral vascular disease) (HCC)   • Hyperglycemia   • Altered mental state   • Severe malnutrition (CMS/HCC)     Past Medical History:   Diagnosis Date   • At risk for sleep apnea    • Coronary artery disease    • Diabetes mellitus (HCC)    • Eye hemorrhage     HISTORY   • GERD (gastroesophageal reflux disease)    • Heart attack (HCC)     50% of the top part of his heart is dead per pt.   • Heart attack (HCC)     AGE 54   • History of transfusion    • Hypertension    • Kidney disease, chronic, stage IV (GFR 15-29 ml/min) (HCC)    • Methicillin resistant Staphylococcus aureus septicemia (HCC)     JULY 2018     Past Surgical History:   Procedure Laterality Date   • AMPUTATION FOOT / TOE      Surgery Left Foot Amputation DIP Fourth Toe AND SECOND   • ARTERIOVENOUS FISTULA/SHUNT SURGERY Left 2/1/2019    Procedure: left arm fistula formation;  Surgeon: Daniel Solis MD;  Location: [facility];  Service: Vascular   • CATARACT EXTRACTION, BILATERAL     • CHOLECYSTECTOMY     • CORONARY ARTERY BYPASS GRAFT      x 4   • EYE SURGERY      for hemorrhaging   • LUMBAR DISCECTOMY  11/25/2013    Spinal Diskectomy Lumbar / Description: L4/5, L5/S1 MAST discectomy Dr. Chirinos 11/25/13   • OTHER SURGICAL HISTORY      Ear Pressure Equalization Tube, Insertion, Bilaterally   • PILONIDAL CYSTECTOMY      X3   • TONSILLECTOMY     • TONSILLECTOMY     • WRIST SURGERY      LEFT, FRACTURE     PT Assessment (last 12 hours)     PT Evaluation and Treatment     Row Name 03/01/22 1011          Physical Therapy Time and Intention    Subjective Information no complaints  -WM     Document Type therapy note (daily note)  -WM     Mode of Treatment individual therapy;  physical therapy  -WM     Patient Effort good  -WM     Symptoms Noted During/After Treatment fatigue  -WM     Row Name 03/01/22 1011          Cognition    Affect/Mental Status (Cognitive) WFL  -WM     Row Name 03/01/22 1011          Hip (Therapeutic Exercise)    Hip AAROM (Therapeutic Exercise) bilateral; flexion; extension; aBduction; aDduction; supine; 10 repetitions; 2 sets  -WM     Row Name 03/01/22 1011          Knee (Therapeutic Exercise)    Knee AAROM (Therapeutic Exercise) bilateral; flexion; extension; 10 repetitions; 2 sets  -WM     Row Name 03/01/22 1011          Ankle (Therapeutic Exercise)    Ankle AAROM (Therapeutic Exercise) bilateral; dorsiflexion; plantarflexion; supine; 10 repetitions; 2 sets  -WM     Row Name             Wound 02/16/22 2028 medial sacral spine Pressure Injury    Wound - Properties Group Placement Date: 02/16/22  -EP Placement Time: 2028  -EP Present on Hospital Admission: Y  -EP Orientation: medial  -EP Location: sacral spine  -EP Primary Wound Type: Pressure inj  -EP     Retired Wound - Properties Group Date first assessed: 02/16/22  -EP Time first assessed: 2028  -EP Present on Hospital Admission: Y  -EP Location: sacral spine  -EP Primary Wound Type: Pressure inj  -EP     Row Name             Wound 02/16/22 2000 Right posterior greater trochanter Incision    Wound - Properties Group Placement Date: 02/16/22  -EP Placement Time: 2000  -EP Present on Hospital Admission: Y  -EP Side: Right  -EP Orientation: posterior  -EP Location: greater trochanter  -EP Primary Wound Type: Incision  -EP     Retired Wound - Properties Group Date first assessed: 02/16/22  -EP Time first assessed: 2000  -EP Present on Hospital Admission: Y  -EP Side: Right  -EP Location: greater trochanter  -EP Primary Wound Type: Incision  -EP     Row Name             Wound 02/16/22 2000 Right posterior ankle Pressure Injury    Wound - Properties Group Placement Date: 02/16/22  -EP Placement Time: 2000  -EP Side:  Right  -EP Orientation: posterior  -EP Location: ankle  -EP Primary Wound Type: Pressure inj  -EP Stage, Pressure Injury : Stage 1  -EP     Retired Wound - Properties Group Date first assessed: 02/16/22  -EP Time first assessed: 2000  -EP Side: Right  -EP Location: ankle  -EP Primary Wound Type: Pressure inj  -EP     Row Name 03/01/22 1011          Progress Summary (PT)    Progress Toward Functional Goals (PT) progress toward functional goals is fair  -WM           User Key  (r) = Recorded By, (t) = Taken By, (c) = Cosigned By    Initials Name Provider Type    Manolo Dennison PTA Physical Therapy Assistant    Leslie Grubbs RN Registered Nurse                Physical Therapy Education                 Title: PT OT SLP Therapies (Done)     Topic: Physical Therapy (Done)     Point: Mobility training (Done)     Learning Progress Summary           Patient Acceptance, E,TB, VU by  at 2/27/2022 0936   Family Acceptance, E,TB, VU by  at 2/27/2022 0936   Caregiver Acceptance, E, VU by TK at 2/18/2022 1033      Show all documentation for this point (1)                 Point: Body mechanics (Done)     Learning Progress Summary           Patient Acceptance, E,TB, VU by  at 2/27/2022 0936   Family Acceptance, E,TB, VU by  at 2/27/2022 0936   Caregiver Acceptance, E, VU by TK at 2/18/2022 1033      Show all documentation for this point (1)                 Point: Precautions (Done)     Learning Progress Summary           Patient Acceptance, E,TB, VU by  at 2/27/2022 0936   Family Acceptance, E,TB, VU by  at 2/27/2022 0936   Caregiver Acceptance, E, VU by TK at 2/18/2022 1033      Show all documentation for this point (1)                             User Key     Initials Effective Dates Name Provider Type Discipline    AW 06/21/21 -  Devora Coe, RN Registered Nurse Nurse    TK 02/09/22 -  Matthew Park, SABA Student PT Student PT              PT Recommendation and Plan     Progress Summary (PT)  Progress Toward  Functional Goals (PT): progress toward functional goals is fair   Outcome Measures     Row Name 03/01/22 1012 02/28/22 0900          How much help from another person do you currently need...    Turning from your back to your side while in flat bed without using bedrails? 2  -WM 2  -DP     Moving from lying on back to sitting on the side of a flat bed without bedrails? 2  -WM 2  -DP     Moving to and from a bed to a chair (including a wheelchair)? 1  -WM 1  -DP     Standing up from a chair using your arms (e.g., wheelchair, bedside chair)? 1  -WM 1  -DP     Climbing 3-5 steps with a railing? 1  -WM 1  -DP     To walk in hospital room? 1  -WM 1  -DP     AM-PAC 6 Clicks Score (PT) 8  -WM 8  -DP            Functional Assessment    Outcome Measure Options -- AM-PAC 6 Clicks Basic Mobility (PT)  -DP           User Key  (r) = Recorded By, (t) = Taken By, (c) = Cosigned By    Initials Name Provider Type    Manolo Dennison PTA Physical Therapy Assistant    Martin Mclean, PT Physical Therapist                 Time Calculation:    PT Charges     Row Name 03/01/22 1011             Time Calculation    PT Received On 03/01/22  -WM              Timed Charges    49749 - PT Therapeutic Exercise Minutes 12  -WM              Total Minutes    Timed Charges Total Minutes 12  -WM       Total Minutes 12  -WM            User Key  (r) = Recorded By, (t) = Taken By, (c) = Cosigned By    Initials Name Provider Type    Manolo Dennison PTA Physical Therapy Assistant              Therapy Charges for Today     Code Description Service Date Service Provider Modifiers Qty    [id number] HC PT THER PROC EA 15 MIN 3/1/2022 Manolo Dominguez PTA GP 1          PT G-Codes  Outcome Measure Options: AM-PAC 6 Clicks Basic Mobility (PT)  AM-PAC 6 Clicks Score (PT): 8  AM-PAC 6 Clicks Score (OT): 10    Manolo Dominguez PTA  3/1/2022

## 2022-03-01 NOTE — SIGNIFICANT NOTE
03/01/22 1504   Plan   Final Discharge Disposition Code 62 - inpatient rehab facility   Final Note Patient being discharged today to [facility] in [place], [place].

## 2022-03-01 NOTE — DISCHARGE SUMMARY
[facility]         HOSPITALIST  DISCHARGE SUMMARY    Patient Name: Min Trejo  : 1960  MRN: [id number]    Date of Admission: 2022  Date of Discharge: 3/1/2022  Primary Care Physician: Gayla Cota APRN    Consults     Date and Time Order Name Status Description    2022  8:40 AM Inpatient Neurosurgery Consult Completed     2022  9:49 PM Inpatient Neurology Consult General      2022  5:30 PM Inpatient Nephrology Consult      2022  1:39 PM IP General Consult (Use specialty-specific consult if known) Completed           Active and Resolved Hospital Problems:  Active Hospital Problems    Diagnosis POA   • Severe malnutrition (CMS/HCC) [E43] Yes   • Altered mental state [R41.82] Yes      Resolved Hospital Problems   No resolved problems to display.       Hospital Course     Hospital Course:  Min Trejo is a 61 y.o. male who presented with chief complaint of altered mental status.  Patient has history of end-stage renal disease dependent on dialysis and has been noncompliant with his therapy for the past few weeks prior to initial presentation.  Patient apparently was found at home unresponsive and was additionally hypotensive.  Upon his presentation patient was found to be acidotic and required emergent dialysis.  Patient continued to be altered and neurology was consulted.  Patient had EEG that was consistent with encephalopathy.  Patient also had blood cultures drawn that were positive for MSSA 1 of 2 bottles positive.  Patient then had repeat cultures that were subsequently negative.  Patient will continue with cefazolin for a total of 14 days therapy for MSSA bacteremia.  Patient also noted to be positive for C. difficile colitis and will complete a total of 10 days of oral vancomycin.  Patient also had lower extremity weakness that was persistent and had a CT of lumbar spine.  Neurosurgery was consulted and it was recommended the patient have  aggressive physical therapy.  Patient was evaluated by physical therapy and was ultimately approved for [facility].  Patient was also set up for outpatient dialysis with [facility].  Patient was stable at the time of discharge.  We will continue his other home medications as before.  He will follow-up with his PCP 1 week.  He will follow-up with nephrology further recommendations.  Of note, while admitted patient requested to be a DNR/DNI and signed an advanced directive making his stepfather and father live decision-makers.      DISCHARGE Follow Up Recommendations for labs and diagnostics: PCP 1 week.  Nephrology as scheduled.      Day of Discharge     Vital Signs:  Temp:  [97.4 °F (36.3 °C)-98.2 °F (36.8 °C)] 98 °F (36.7 °C)  Heart Rate:  [] 103  Resp:  [16-18] 16  BP: (121-159)/(61-79) 121/61  Physical Exam:     Constitutional: Alert, awake, no acute distress  HEENT:  PERRLA, EOMI; No Scleral icterus  Neck:  Supple; No Mass, No Lymphadenopathy  Cardiovascular:  No Rubs, No Edema, No JVD  Respiratory: No respiratory distress, unlabored breathing, saturating well on room air  Abdomen:  Normal BS all 4 Quadrants; No Guarding, No Tenderness  Musculoskeletal:  Pulses Positive all 4 Ext; No Cyanosis, No Edema  Neurological:  Alert+Ox3, Mental status WNL; No Dysarthria  Skin:  No Rash, No Cellulitis, No Erythema      Discharge Details        Discharge Medications      New Medications      Instructions Start Date   aspirin 81 MG EC tablet   81 mg, Oral, Daily   Start Date: March 2, 2022     calcium carbonate 500 MG chewable tablet  Commonly known as: TUMS   2 tablets, Oral, Every 6 Hours PRN      ceFAZolin 1-4 GM/50ML-% IVPB  Commonly known as: ANCEF   1 g, Intravenous, Every 24 Hours      famotidine 10 MG tablet  Commonly known as: PEPCID   10 mg, Oral, Daily   Start Date: March 2, 2022     miconazole 2 % powder  Commonly known as: MICOTIN   Topical, Every 12 Hours Scheduled     "  multivitamin with minerals tablet tablet   1 tablet, Oral, Daily   Start Date: March 2, 2022     saccharomyces boulardii 250 MG capsule  Commonly known as: FLORASTOR   250 mg, Oral, 2 Times Daily      vancomycin 125 MG capsule  Commonly known as: VANCOCIN   125 mg, Oral, Every 6 Hours Scheduled         Changes to Medications      Instructions Start Date   buPROPion  MG 12 hr tablet  Commonly known as: WELLBUTRIN SR  What changed:   · medication strength  · how much to take  · when to take this   100 mg, Oral, Daily   Start Date: March 2, 2022     sertraline 50 MG tablet  Commonly known as: ZOLOFT  What changed:   · medication strength  · how much to take   50 mg, Oral, Daily   Start Date: March 2, 2022        Continue These Medications      Instructions Start Date   atorvastatin 40 MG tablet  Commonly known as: LIPITOR   40 mg, Oral, Nightly         Stop These Medications    bumetanide 2 MG tablet  Commonly known as: BUMEX     hydrALAZINE 50 MG tablet  Commonly known as: APRESOLINE     insulin aspart 100 UNIT/ML injection  Commonly known as: novoLOG     pantoprazole 40 MG EC tablet  Commonly known as: PROTONIX     Tresiba 100 UNIT/ML solution injection  Generic drug: Insulin Degludec            Allergies   Allergen Reactions   • Hydrocodone-Acetaminophen Itching     Adverse Reaction : Headache \"makes pt. Mean\"  Adverse Reaction : Headache \"makes me mean\"   • Hydrocodone-Acetaminophen Itching     Adverse Reaction : Headache \"makes me mean\"   • Codeine Rash and Other (See Comments)     3/30/21: patient reports codeine causes itching       Discharge Disposition:  Rehab Facility or Unit (DC - External)    Diet:  Hospital:  Diet Order   Procedures   • Diet Regular; Consistent Carbohydrate, Renal       Discharge Activity:       CODE STATUS:  Code Status and Medical Interventions:   Ordered at: 02/22/22 1457     Medical Intervention Limits:    NO intubation (DNI)     Code Status (Patient has no pulse and is not " breathing):    No CPR (Do Not Attempt to Resuscitate)     Medical Interventions (Patient has pulse or is breathing):    Limited Support         No future appointments.        Pertinent  and/or Most Recent Results     PROCEDURES:   EEG    Result Date: 2/18/2022  Narrative: Clinical history: 61-year-old man evaluated for confusion. EEG description: This is a portable 19 channel EEG recording using the 10/20 international classification of electrode placement. EEG report: The underlying background activity during wakefulness consists of 5 Hz theta activity amplitude of 30 µV intermixed with muscle artifacts and faster frequencies.  During drowsiness there is attenuation of background activity and 2 to 3 Hz delta activity amplitude of 30 µV noted in the central regions.  Faster beta frequencies air noted intermixed with a slower frequencies.  Early Vertex waves are noted.  There were no epileptiform discharges that was recorded.  There was no focal slowing.  Photic summation did not activate any abnormalities. Impression: This EEG which was recorded during wakefulness and drowsiness is abnormal secondary to slowing of background activity.  This finding suggestive of a mild encephalopathy of nonspecific etiology.    CT Head Without Contrast    Result Date: 2/16/2022  Narrative: PROCEDURE: CT HEAD WO CONTRAST  COMPARISON:  None INDICATIONS: altered mental status  PROTOCOL:   Standard imaging protocol performed    RADIATION:   DLP: 1081.2mGy*cm   MA and/or KV was adjusted to minimize radiation dose.     TECHNIQUE: After obtaining the patient's consent, CT images were obtained without non-ionic intravenous contrast material.  FINDINGS:  There is no calvarial fracture.  There is a right mastoid effusion and the small amount of fluid in left mastoid air cells.  Paranasal sinuses demonstrate mild right maxillary sinus disease with air-fluid level.  Patient's head is not straight in the scanner.  There may be previous cataract  extractions.  There is mild parenchymal volume loss.  No extra-axial hemorrhage or intraparenchymal hemorrhage.  There are dystrophic calcifications in the interhemispheric fissure doubtful significance.  No comparison exams are available.  There is mild hypodensity in the right frontal white matter.  There also is hypodensity seen in periventricular regions greater on the left adjacent the anterior horns of the lateral ventricles.      Impression:   1. No acute intracranial hemorrhage. 2. Mild parenchymal volume loss. 3. Hypodensity adjacent to the anterior horns of the lateral ventricles greater on the left.  There also is mild hypodensity in the right frontal white matter.  This could reflect small vessel ischemic changes.  If concern for acute stroke consider MRI.  No comparison exam available. 4. Mild right maxillary sinus disease with an air-fluid level could reflect acute sinus disease. 5. Bilateral mastoid effusions.     SANTY PANG MD       Electronically Signed and Approved By: SANTY PANG MD on 2/16/2022 at 12:27             CT Lumbar Spine Without Contrast    Result Date: 2/20/2022  Narrative: PROCEDURE: CT LUMBAR SPINE WO CONTRAST  COMPARISON: None  INDICATIONS: LE weakness  PROTOCOL:   Standard imaging protocol performed    RADIATION:   DLP: 769 mGy*cm   Automated exposure control was utilized to minimize radiation dose.  TECHNIQUE: After obtaining the patient's consent, multi-planar CT images were created without intravenous contrast material.   FINDINGS:  Lumbar bodies have normal height.  Alignment is preserved.  Degenerative disc disease, greatest at L4-L5 and L5-S1.  No acute fracture.  L1-L2:  Small circumferential disc osteophyte.  No central canal narrowing.  Mild foraminal narrowing.  New  L2-L3:  Small circumferential disc osteophyte.  Mild central canal narrowing.  No foraminal narrowing.  L3-L4:  Posterior disc osteophyte.  Ligamentum flavum thickening.  Mild central canal  narrowing.  No foraminal narrowing.  New  L4-L5:  Circumferential disc osteophyte.  Facet arthrosis.  Previous right hemilaminectomy.  Moderate to moderately severe central canal narrowing.  Foraminal disc osteophytes.  Moderately severe foraminal narrowing.  New  L5-S1:  Circumferential disc osteophyte.  Facet arthrosis.  Previous left hemilaminectomy.  Moderate central canal narrowing.  Foraminal disc osteophytes.  Moderately severe to severe bilateral foraminal narrowing.  No paravertebral mass.      Impression:  Degenerative changes, greatest at L4-L5 and L5-S1.  Foraminal narrowing moderately severe at L4-L5 and moderately severe to severe at L5-S1.     BELEN RICHARDSON MD       Electronically Signed and Approved By: BELEN RICHARDSON MD on 2/20/2022 at 16:44             MRI Brain Without Contrast    Result Date: 2/16/2022  Narrative: PROCEDURE: MRI BRAIN WO CONTRAST  COMPARISON: None  INDICATIONS: evaluate for stroke      TECHNIQUE: A variety of imaging planes and parameters were utilized for visualization of suspected pathology.  Images were performed without contrast.  FINDINGS: T1 sagittals demonstrate mild motion artifact.  No acute findings.  No restricted diffusion or acute stroke.  No extra-axial fluid collection.  No mass effect or midline shift.  Flow voids appear intact.  Parenchymal volume loss.  Previous cataract extractions.  Mild right maxillary sinus disease.  Bilateral mastoid effusions.  Is mild to Moderate small vessel ischemic changes suspected greater on the left.  There is some increased T2 signal in the left parietal subcortical region.  No contrast utilized.  No evidence for susceptibility or hemorrhage.      Impression:   1. No restricted diffusion or acute stroke. 2. Right maxillary sinus disease.  Bilateral mastoid effusions. 3. Mild to moderate small vessel ischemic changes greater in the left frontal region.  Increased subcortical T2 signal in the left parietal region likely sequela from  previous ischemia.      SANTY PANG MD       Electronically Signed and Approved By: SANTY PANG MD on 2/16/2022 at 21:00             XR Chest 1 View    Result Date: 2/16/2022  Narrative: PROCEDURE: XR CHEST 1 VW  COMPARISON: [facility], CR, CHEST AP/PA 1 VIEW, 8/17/2020, 19:21.  [facility], CR, XR CHEST 1 VW, 8/23/2021, 14:09.  INDICATIONS: Weak/Dizzy/AMS triage protocol  FINDINGS:  Chronic changes in the right lung base with pleural thickening and/or infusion and right-sided volume loss.  Overall the appearance is unchanged from prior exams without evidence of acute process.  Left lung is clear.  There are mild chronic changes.  Cardiomegaly status post CABG.  Regional skeleton is unremarkable.      Impression:   1. Chronic changes, but no acute cardiopulmonary disease.       ANAI MCCANN MD       Electronically Signed and Approved By: ANAI MCCANN MD on 2/16/2022 at 12:15                 LAB RESULTS:      Lab 03/01/22  0550 02/27/22  0507 02/26/22  0620 02/23/22  0928   WBC 8.57 9.33 7.59 7.27   HEMOGLOBIN 12.1* 12.7* 12.3* 11.1*   HEMATOCRIT 41.2 42.5 40.8 38.0   PLATELETS 255 217 259 213   NEUTROS ABS 6.32 6.56  --   --    IMMATURE GRANS (ABS) 0.03 0.04  --   --    LYMPHS ABS 1.28 1.40  --   --    MONOS ABS 0.77 1.15*  --   --    EOS ABS 0.03 0.01  --   --    MCV 99.8* 101.0* 100.2* 100.5*         Lab 03/01/22  0550 02/28/22  0508 02/27/22  0507 02/26/22  0620 02/25/22  0523 02/24/22  0743 02/23/22  0928   SODIUM 140 134* 135* 140 138   < > 139   POTASSIUM 3.9 4.3 4.8 3.5 3.6   < > 3.4*   CHLORIDE 101 98 99 101 101   < > 100   CO2 21.8* 19.0* 18.8* 26.1 24.0   < > 26.5   ANION GAP 17.2* 17.0* 17.2* 12.9 13.0   < > 12.5   BUN 31* 22* 15 23* 16   < > 20   CREATININE 5.43* 4.20* 3.17* 3.94* 3.02*   < > 3.90*   EGFR 11.4*  --   --   --   --   --   --    GLUCOSE 117* 103* 107* 111* 96   < > 98   CALCIUM 9.4 9.0 8.9 9.3 8.8   < > 9.0   MAGNESIUM  --   --   --   --   --   --   2.4   PHOSPHORUS 4.1 3.5 2.3*  --   --   --   --     < > = values in this interval not displayed.         Lab 03/01/22  0550 02/28/22  0508 02/27/22  0507 02/26/22  0620   TOTAL PROTEIN  --   --   --  6.0   ALBUMIN 3.10* 2.90* 2.70* 3.10*   GLOBULIN  --   --   --  2.9   ALT (SGPT)  --   --   --  <5   AST (SGOT)  --   --   --  13   BILIRUBIN  --   --   --  0.2   ALK PHOS  --   --   --  100                     Brief Urine Lab Results  (Last result in the past 365 days)      Color   Clarity   Blood   Leuk Est   Nitrite   Protein   CREAT   Urine HCG        02/16/22 1233 Dark Yellow   Turbid   Moderate (2+)   Moderate (2+)   Negative   >=300 mg/dL (3+)               Microbiology Results (last 10 days)     Procedure Component Value - Date/Time    COVID PRE-OP / PRE-PROCEDURE SCREENING ORDER (NO ISOLATION) - Swab, Nasopharynx [[id number]]  (Normal) Collected: 03/01/22 0900    Lab Status: Final result Specimen: Swab from Nasopharynx Updated: 03/01/22 1425    Narrative:      The following orders were created for panel order COVID PRE-OP / PRE-PROCEDURE SCREENING ORDER (NO ISOLATION) - Swab, Nasopharynx.  Procedure                               Abnormality         Status                     ---------                               -----------         ------                     COVID-19,APTIMA PANTHER(...[[id number]]  Normal              Final result                 Please view results for these tests on the individual orders.    COVID-19,APTIMA PANTHER([facility]),[facility], NP/OP SWAB IN UTM/VTM/SALINE TRANSPORT MEDIA,24 HR TAT - Swab, Nasopharynx [[id number]]  (Normal) Collected: 03/01/22 0900    Lab Status: Final result Specimen: Swab from Nasopharynx Updated: 03/01/22 1425     COVID19 Not Detected    Narrative:      Fact sheet for providers: https://www.fda.gov/media/534408/download     Fact sheet for patients: https://www.fda.gov/media/349485/download    Test performed by RT PCR.    Clostridium Difficile Toxin - Stool, Per  Rectum [[id number]]  (Abnormal) Collected: 02/25/22 1443    Lab Status: Final result Specimen: Stool from Per Rectum Updated: 02/25/22 1626    Narrative:      The following orders were created for panel order Clostridium Difficile Toxin - Stool, Per Rectum.  Procedure                               Abnormality         Status                     ---------                               -----------         ------                     Clostridium Difficile To...[[id number]]  Abnormal            Final result                 Please view results for these tests on the individual orders.    Clostridium Difficile Toxin, PCR - Stool, Per Rectum [[id number]]  (Abnormal) Collected: 02/25/22 1443    Lab Status: Final result Specimen: Stool from Per Rectum Updated: 02/25/22 1626     C. Difficile Toxins by PCR Positive     027 Toxin Presumptive Negative          CT Head Without Contrast    Result Date: 2/16/2022  Impression:   1. No acute intracranial hemorrhage. 2. Mild parenchymal volume loss. 3. Hypodensity adjacent to the anterior horns of the lateral ventricles greater on the left.  There also is mild hypodensity in the right frontal white matter.  This could reflect small vessel ischemic changes.  If concern for acute stroke consider MRI.  No comparison exam available. 4. Mild right maxillary sinus disease with an air-fluid level could reflect acute sinus disease. 5. Bilateral mastoid effusions.     SANTY PANG MD       Electronically Signed and Approved By: SANTY PANG MD on 2/16/2022 at 12:27             CT Lumbar Spine Without Contrast    Result Date: 2/20/2022  Impression:  Degenerative changes, greatest at L4-L5 and L5-S1.  Foraminal narrowing moderately severe at L4-L5 and moderately severe to severe at L5-S1.     BELEN RICHARDSON MD       Electronically Signed and Approved By: BELEN RICHARDSON MD on 2/20/2022 at 16:44             MRI Brain Without Contrast    Result Date: 2/16/2022  Impression:   1. No restricted  diffusion or acute stroke. 2. Right maxillary sinus disease.  Bilateral mastoid effusions. 3. Mild to moderate small vessel ischemic changes greater in the left frontal region.  Increased subcortical T2 signal in the left parietal region likely sequela from previous ischemia.      SANTY PANG MD       Electronically Signed and Approved By: SANTY PANG MD on 2/16/2022 at 21:00             XR Chest 1 View    Result Date: 2/16/2022  Impression:   1. Chronic changes, but no acute cardiopulmonary disease.       ANAI MCCANN MD       Electronically Signed and Approved By: ANAI MCCANN MD on 2/16/2022 at 12:15               Results for orders placed in visit on 03/10/14    SCANNED VASCULAR STUDIES      Results for orders placed in visit on 03/10/14    SCANNED VASCULAR STUDIES          Labs Pending at Discharge:  Pending Labs     Order Current Status    Hepatitis B Surface Antigen In process            Time spent on Discharge including face to face service:  32 minutes    Electronically signed by Mauricio Clinton DO, 03/01/22, 2:44 PM EST.

## 2022-03-06 ENCOUNTER — APPOINTMENT (OUTPATIENT)
Dept: GENERAL RADIOLOGY | Facility: HOSPITAL | Age: 62
End: 2022-03-06

## 2022-03-06 ENCOUNTER — HOSPITAL ENCOUNTER (EMERGENCY)
Facility: HOSPITAL | Age: 62
Discharge: NURSING, REHAB OR LONG-TERM CARE | End: 2022-03-06
Attending: EMERGENCY MEDICINE | Admitting: EMERGENCY MEDICINE

## 2022-03-06 VITALS
TEMPERATURE: 98.3 F | HEIGHT: 74 IN | DIASTOLIC BLOOD PRESSURE: 84 MMHG | HEART RATE: 84 BPM | BODY MASS INDEX: 27.19 KG/M2 | SYSTOLIC BLOOD PRESSURE: 148 MMHG | OXYGEN SATURATION: 93 % | WEIGHT: 211.86 LBS | RESPIRATION RATE: 16 BRPM

## 2022-03-06 DIAGNOSIS — N39.0 UTI (URINARY TRACT INFECTION) WITH PYURIA: ICD-10-CM

## 2022-03-06 DIAGNOSIS — R06.89 HYPERCARBIA: ICD-10-CM

## 2022-03-06 DIAGNOSIS — R41.82 ALTERED MENTAL STATUS, UNSPECIFIED ALTERED MENTAL STATUS TYPE: Primary | ICD-10-CM

## 2022-03-06 LAB
ALBUMIN SERPL-MCNC: 3.2 G/DL (ref 3.5–5.2)
ALBUMIN/GLOB SERPL: 1.2 G/DL
ALP SERPL-CCNC: 104 U/L (ref 39–117)
ALT SERPL W P-5'-P-CCNC: <5 U/L (ref 1–41)
ANION GAP SERPL CALCULATED.3IONS-SCNC: 13.1 MMOL/L (ref 5–15)
ARTERIAL PATENCY WRIST A: POSITIVE
AST SERPL-CCNC: 12 U/L (ref 1–40)
BACTERIA UR QL AUTO: ABNORMAL /HPF
BASE EXCESS BLDA CALC-SCNC: 0 MMOL/L (ref -2–2)
BASOPHILS # BLD AUTO: 0.11 10*3/MM3 (ref 0–0.2)
BASOPHILS NFR BLD AUTO: 1.4 % (ref 0–1.5)
BDY SITE: ABNORMAL
BILIRUB SERPL-MCNC: 0.2 MG/DL (ref 0–1.2)
BILIRUB UR QL STRIP: NEGATIVE
BUN SERPL-MCNC: 49 MG/DL (ref 6–20)
BUN/CREAT SERPL: 6.3 (ref 7–25)
CALCIUM SPEC-SCNC: 9.4 MG/DL (ref 8.6–10.5)
CHLORIDE SERPL-SCNC: 99 MMOL/L (ref 98–107)
CLARITY UR: ABNORMAL
CO2 SERPL-SCNC: 27.9 MMOL/L (ref 22–29)
COHGB MFR BLD: 1.1 % (ref 0–1.5)
COLOR UR: ABNORMAL
CREAT SERPL-MCNC: 7.76 MG/DL (ref 0.76–1.27)
D-LACTATE SERPL-SCNC: 1.1 MMOL/L (ref 0.5–2)
DEPRECATED RDW RBC AUTO: 61.9 FL (ref 37–54)
EGFRCR SERPLBLD CKD-EPI 2021: 7.4 ML/MIN/1.73
EOSINOPHIL # BLD AUTO: 0.02 10*3/MM3 (ref 0–0.4)
EOSINOPHIL NFR BLD AUTO: 0.3 % (ref 0.3–6.2)
ERYTHROCYTE [DISTWIDTH] IN BLOOD BY AUTOMATED COUNT: 17 % (ref 12.3–15.4)
FHHB: 1.1 % (ref 0–5)
GAS FLOW AIRWAY: 2 LPM
GLOBULIN UR ELPH-MCNC: 2.7 GM/DL
GLUCOSE SERPL-MCNC: 176 MG/DL (ref 65–99)
GLUCOSE UR STRIP-MCNC: NEGATIVE MG/DL
HCO3 BLDA-SCNC: 26.8 MMOL/L (ref 22–26)
HCT VFR BLD AUTO: 39.9 % (ref 37.5–51)
HGB BLD-MCNC: 12 G/DL (ref 13–17.7)
HGB BLDA-MCNC: 12.9 G/DL (ref 13.8–16.4)
HGB UR QL STRIP.AUTO: ABNORMAL
HOLD SPECIMEN: NORMAL
HYALINE CASTS UR QL AUTO: ABNORMAL /LPF
IMM GRANULOCYTES # BLD AUTO: 0.02 10*3/MM3 (ref 0–0.05)
IMM GRANULOCYTES NFR BLD AUTO: 0.3 % (ref 0–0.5)
INHALED O2 CONCENTRATION: 28 %
KETONES UR QL STRIP: ABNORMAL
LACTATE BLDA-SCNC: ABNORMAL MMOL/L
LEUKOCYTE ESTERASE UR QL STRIP.AUTO: ABNORMAL
LYMPHOCYTES # BLD AUTO: 0.67 10*3/MM3 (ref 0.7–3.1)
LYMPHOCYTES NFR BLD AUTO: 8.4 % (ref 19.6–45.3)
MCH RBC QN AUTO: 30.2 PG (ref 26.6–33)
MCHC RBC AUTO-ENTMCNC: 30.1 G/DL (ref 31.5–35.7)
MCV RBC AUTO: 100.3 FL (ref 79–97)
METHGB BLD QL: 0.2 % (ref 0–1.5)
MODALITY: ABNORMAL
MONOCYTES # BLD AUTO: 0.46 10*3/MM3 (ref 0.1–0.9)
MONOCYTES NFR BLD AUTO: 5.8 % (ref 5–12)
NEUTROPHILS NFR BLD AUTO: 6.72 10*3/MM3 (ref 1.7–7)
NEUTROPHILS NFR BLD AUTO: 83.8 % (ref 42.7–76)
NITRITE UR QL STRIP: NEGATIVE
NRBC BLD AUTO-RTO: 0 /100 WBC (ref 0–0.2)
OXYHGB MFR BLDV: 97.6 % (ref 94–99)
PCO2 BLDA: 52.6 MM HG (ref 35–45)
PH BLDA: 7.33 PH UNITS (ref 7.35–7.45)
PH UR STRIP.AUTO: 5.5 [PH] (ref 5–8)
PLATELET # BLD AUTO: 265 10*3/MM3 (ref 140–450)
PMV BLD AUTO: 10.5 FL (ref 6–12)
PO2 BLD: 657 MM[HG] (ref 0–500)
PO2 BLDA: 184 MM HG (ref 80–100)
POTASSIUM SERPL-SCNC: 3.7 MMOL/L (ref 3.5–5.2)
PROT SERPL-MCNC: 5.9 G/DL (ref 6–8.5)
PROT UR QL STRIP: ABNORMAL
RBC # BLD AUTO: 3.98 10*6/MM3 (ref 4.14–5.8)
RBC # UR STRIP: ABNORMAL /HPF
REF LAB TEST METHOD: ABNORMAL
SAO2 % BLDCOA: 98.9 % (ref 95–99)
SODIUM SERPL-SCNC: 140 MMOL/L (ref 136–145)
SP GR UR STRIP: 1.02 (ref 1–1.03)
SQUAMOUS #/AREA URNS HPF: ABNORMAL /HPF
UROBILINOGEN UR QL STRIP: ABNORMAL
WBC # UR STRIP: ABNORMAL /HPF
WBC NRBC COR # BLD: 8 10*3/MM3 (ref 3.4–10.8)
WHOLE BLOOD HOLD SPECIMEN: NORMAL

## 2022-03-06 PROCEDURE — 25010000002 CEFTRIAXONE PER 250 MG: Performed by: EMERGENCY MEDICINE

## 2022-03-06 PROCEDURE — 83605 ASSAY OF LACTIC ACID: CPT | Performed by: EMERGENCY MEDICINE

## 2022-03-06 PROCEDURE — 87186 SC STD MICRODIL/AGAR DIL: CPT | Performed by: EMERGENCY MEDICINE

## 2022-03-06 PROCEDURE — 87086 URINE CULTURE/COLONY COUNT: CPT | Performed by: EMERGENCY MEDICINE

## 2022-03-06 PROCEDURE — 85025 COMPLETE CBC W/AUTO DIFF WBC: CPT | Performed by: EMERGENCY MEDICINE

## 2022-03-06 PROCEDURE — 81001 URINALYSIS AUTO W/SCOPE: CPT | Performed by: EMERGENCY MEDICINE

## 2022-03-06 PROCEDURE — 99284 EMERGENCY DEPT VISIT MOD MDM: CPT

## 2022-03-06 PROCEDURE — P9612 CATHETERIZE FOR URINE SPEC: HCPCS

## 2022-03-06 PROCEDURE — 82375 ASSAY CARBOXYHB QUANT: CPT | Performed by: EMERGENCY MEDICINE

## 2022-03-06 PROCEDURE — 83050 HGB METHEMOGLOBIN QUAN: CPT | Performed by: EMERGENCY MEDICINE

## 2022-03-06 PROCEDURE — 82805 BLOOD GASES W/O2 SATURATION: CPT | Performed by: EMERGENCY MEDICINE

## 2022-03-06 PROCEDURE — 71045 X-RAY EXAM CHEST 1 VIEW: CPT

## 2022-03-06 PROCEDURE — 87077 CULTURE AEROBIC IDENTIFY: CPT | Performed by: EMERGENCY MEDICINE

## 2022-03-06 PROCEDURE — 80053 COMPREHEN METABOLIC PANEL: CPT | Performed by: EMERGENCY MEDICINE

## 2022-03-06 PROCEDURE — 96365 THER/PROPH/DIAG IV INF INIT: CPT

## 2022-03-06 PROCEDURE — 36600 WITHDRAWAL OF ARTERIAL BLOOD: CPT | Performed by: EMERGENCY MEDICINE

## 2022-03-06 PROCEDURE — 36415 COLL VENOUS BLD VENIPUNCTURE: CPT

## 2022-03-06 RX ORDER — AMOXICILLIN AND CLAVULANATE POTASSIUM 250; 62.5 MG/5ML; MG/5ML
250 POWDER, FOR SUSPENSION ORAL 2 TIMES DAILY
Qty: 200 ML | Refills: 0 | Status: SHIPPED | OUTPATIENT
Start: 2022-03-06

## 2022-03-06 NOTE — ED NOTES
Patient has answered all questions appropriately and states he does not know why he is really here.      Joya Ch, RN  03/06/22 0972
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
## 2022-03-08 LAB — BACTERIA SPEC AEROBE CULT: ABNORMAL

## 2022-03-15 ENCOUNTER — HOSPITAL ENCOUNTER (EMERGENCY)
Facility: HOSPITAL | Age: 62
Discharge: HOME | End: 2022-03-16
Attending: EMERGENCY MEDICINE | Admitting: EMERGENCY MEDICINE

## 2022-03-15 DIAGNOSIS — N18.6 STAGE 5 CHRONIC KIDNEY DISEASE ON CHRONIC DIALYSIS: Primary | ICD-10-CM

## 2022-03-15 DIAGNOSIS — Z99.2 STAGE 5 CHRONIC KIDNEY DISEASE ON CHRONIC DIALYSIS: Primary | ICD-10-CM

## 2022-03-15 LAB
ALBUMIN SERPL-MCNC: 3 G/DL (ref 3.5–5.2)
ALBUMIN/GLOB SERPL: 0.9 G/DL
ALP SERPL-CCNC: 101 U/L (ref 39–117)
ALT SERPL W P-5'-P-CCNC: <5 U/L (ref 1–41)
ANION GAP SERPL CALCULATED.3IONS-SCNC: 16.9 MMOL/L (ref 5–15)
AST SERPL-CCNC: 11 U/L (ref 1–40)
BASOPHILS # BLD AUTO: 0.06 10*3/MM3 (ref 0–0.2)
BASOPHILS NFR BLD AUTO: 0.8 % (ref 0–1.5)
BILIRUB SERPL-MCNC: 0.2 MG/DL (ref 0–1.2)
BUN SERPL-MCNC: 87 MG/DL (ref 6–20)
BUN/CREAT SERPL: 10.5 (ref 7–25)
CALCIUM SPEC-SCNC: 9 MG/DL (ref 8.6–10.5)
CHLORIDE SERPL-SCNC: 98 MMOL/L (ref 98–107)
CO2 SERPL-SCNC: 24.1 MMOL/L (ref 22–29)
CREAT SERPL-MCNC: 8.31 MG/DL (ref 0.76–1.27)
DEPRECATED RDW RBC AUTO: 57.9 FL (ref 37–54)
EGFRCR SERPLBLD CKD-EPI 2021: 6.8 ML/MIN/1.73
EOSINOPHIL # BLD AUTO: 0.15 10*3/MM3 (ref 0–0.4)
EOSINOPHIL NFR BLD AUTO: 2.1 % (ref 0.3–6.2)
ERYTHROCYTE [DISTWIDTH] IN BLOOD BY AUTOMATED COUNT: 15.9 % (ref 12.3–15.4)
GLOBULIN UR ELPH-MCNC: 3.2 GM/DL
GLUCOSE SERPL-MCNC: 149 MG/DL (ref 65–99)
HCT VFR BLD AUTO: 46 % (ref 37.5–51)
HGB BLD-MCNC: 13.6 G/DL (ref 13–17.7)
HOLD SPECIMEN: NORMAL
IMM GRANULOCYTES # BLD AUTO: 0.03 10*3/MM3 (ref 0–0.05)
IMM GRANULOCYTES NFR BLD AUTO: 0.4 % (ref 0–0.5)
LYMPHOCYTES # BLD AUTO: 1.02 10*3/MM3 (ref 0.7–3.1)
LYMPHOCYTES NFR BLD AUTO: 14 % (ref 19.6–45.3)
MAGNESIUM SERPL-MCNC: 3.2 MG/DL (ref 1.6–2.6)
MCH RBC QN AUTO: 28.9 PG (ref 26.6–33)
MCHC RBC AUTO-ENTMCNC: 29.6 G/DL (ref 31.5–35.7)
MCV RBC AUTO: 97.7 FL (ref 79–97)
MONOCYTES # BLD AUTO: 0.64 10*3/MM3 (ref 0.1–0.9)
MONOCYTES NFR BLD AUTO: 8.8 % (ref 5–12)
NEUTROPHILS NFR BLD AUTO: 5.37 10*3/MM3 (ref 1.7–7)
NEUTROPHILS NFR BLD AUTO: 73.9 % (ref 42.7–76)
NRBC BLD AUTO-RTO: 0 /100 WBC (ref 0–0.2)
PLATELET # BLD AUTO: 224 10*3/MM3 (ref 140–450)
PMV BLD AUTO: 10.7 FL (ref 6–12)
POTASSIUM SERPL-SCNC: 3.3 MMOL/L (ref 3.5–5.2)
PROT SERPL-MCNC: 6.2 G/DL (ref 6–8.5)
QT INTERVAL: 375 MS
RBC # BLD AUTO: 4.71 10*6/MM3 (ref 4.14–5.8)
SODIUM SERPL-SCNC: 139 MMOL/L (ref 136–145)
WBC NRBC COR # BLD: 7.27 10*3/MM3 (ref 3.4–10.8)
WHOLE BLOOD HOLD SPECIMEN: NORMAL

## 2022-03-15 PROCEDURE — 36415 COLL VENOUS BLD VENIPUNCTURE: CPT | Performed by: EMERGENCY MEDICINE

## 2022-03-15 PROCEDURE — 80053 COMPREHEN METABOLIC PANEL: CPT | Performed by: EMERGENCY MEDICINE

## 2022-03-15 PROCEDURE — 85025 COMPLETE CBC W/AUTO DIFF WBC: CPT | Performed by: EMERGENCY MEDICINE

## 2022-03-15 PROCEDURE — 99283 EMERGENCY DEPT VISIT LOW MDM: CPT

## 2022-03-15 PROCEDURE — 93005 ELECTROCARDIOGRAM TRACING: CPT | Performed by: EMERGENCY MEDICINE

## 2022-03-15 PROCEDURE — 83735 ASSAY OF MAGNESIUM: CPT | Performed by: EMERGENCY MEDICINE

## 2022-03-15 NOTE — ED PROVIDER NOTES
"Time: 3:05 PM EDT  Arrived by: ambulance  Chief Complaint: Missed dialysis  History provided by: Patient  History is limited by: N/A     History of Present Illness:  Patient is a 61 y.o. year old male who presents to the emergency department who refused dialysis today at his dialysis center.  The patient reports unchanged shortness of breath with no chest pain.  The patient has no nausea or vomiting.  The patient denies cough and hemoptysis.  The patient has no subjective neurological dose including numbness or tingling.    HPI    Similar Symptoms Previously: yes  Recently seen: yes      Patient Care Team  Primary Care Provider: Gayla Cota APRN    Past Medical History:     Allergies   Allergen Reactions   • Hydrocodone-Acetaminophen Itching     Adverse Reaction : Headache \"makes pt. Mean\"  Adverse Reaction : Headache \"makes me mean\"   • Hydrocodone-Acetaminophen Itching     Adverse Reaction : Headache \"makes me mean\"   • Codeine Rash and Other (See Comments)     3/30/21: patient reports codeine causes itching     Past Medical History:   Diagnosis Date   • At risk for sleep apnea    • Coronary artery disease    • Diabetes mellitus (HCC)    • Eye hemorrhage     HISTORY   • GERD (gastroesophageal reflux disease)    • Heart attack (HCC)     50% of the top part of his heart is dead per pt.   • Heart attack (HCC)     AGE 54   • History of transfusion    • Hypertension    • Kidney disease, chronic, stage IV (GFR 15-29 ml/min) (HCC)    • Methicillin resistant Staphylococcus aureus septicemia (HCC)     JULY 2018     Past Surgical History:   Procedure Laterality Date   • AMPUTATION FOOT / TOE      Surgery Left Foot Amputation DIP Fourth Toe AND SECOND   • ARTERIOVENOUS FISTULA/SHUNT SURGERY Left 2/1/2019    Procedure: left arm fistula formation;  Surgeon: Daniel Solis MD;  Location: [facility];  Service: Vascular   • CATARACT EXTRACTION, BILATERAL     • CHOLECYSTECTOMY     • CORONARY ARTERY BYPASS " GRAFT      x 4   • EYE SURGERY      for hemorrhaging   • LUMBAR DISCECTOMY  11/25/2013    Spinal Diskectomy Lumbar / Description: L4/5, L5/S1 MAST discectomy Dr. Chirinos 11/25/13   • OTHER SURGICAL HISTORY      Ear Pressure Equalization Tube, Insertion, Bilaterally   • PILONIDAL CYSTECTOMY      X3   • TONSILLECTOMY     • TONSILLECTOMY     • WRIST SURGERY      LEFT, FRACTURE     Family History   Problem Relation Age of Onset   • Aortic aneurysm Mother    • Diabetes Mother         mellitus   • Heart disease Mother    • Coronary artery disease Father    • Diabetes Maternal Aunt    • Malig Hyperthermia Neg Hx        Home Medications:  Prior to Admission medications    Medication Sig Start Date End Date Taking? Authorizing Provider   amoxicillin-clavulanate (AUGMENTIN) 250-62.5 MG/5ML suspension Take 5 mL by mouth 2 (Two) Times a Day. 3/6/22   Samy Ambriz MD   aspirin 81 MG EC tablet Take 1 tablet by mouth Daily for 30 days. 3/2/22 4/1/22  Mauricio Clinton DO   atorvastatin (LIPITOR) 40 MG tablet Take 1 tablet by mouth Every Night. 6/24/21   Gayla Cota APRN   buPROPion SR (WELLBUTRIN SR) 100 MG 12 hr tablet Take 1 tablet by mouth Daily for 30 days. 3/2/22 4/1/22  Mauricio Clinton DO   calcium carbonate (TUMS) 500 MG chewable tablet Chew 2 tablets Every 6 (Six) Hours As Needed for Indigestion or Heartburn for up to 30 days. 3/1/22 3/31/22  Mauricio Clinton DO   famotidine (PEPCID) 10 MG tablet Take 1 tablet by mouth Daily for 30 days. 3/2/22 4/1/22  Mauricio Clinton DO   miconazole (MICOTIN) 2 % powder Apply  topically to the appropriate area as directed Every 12 (Twelve) Hours for 28 days. 3/1/22 3/29/22  Mauricio Clinton DO   multivitamin with minerals tablet tablet Take 1 tablet by mouth Daily for 30 days. 3/2/22 4/1/22  Mauricio Clinton DO   saccharomyces boulardii (FLORASTOR) 250 MG capsule Take 1 capsule by mouth 2 (Two) Times a Day for 30 days. 3/1/22 3/31/22  Mauricio Clinton DO   sertraline (ZOLOFT) 50  "MG tablet Take 1 tablet by mouth Daily for 30 days. 3/2/22 4/1/22  Mauricio Clinton DO        Social History:   Social History     Tobacco Use   • Smoking status: Current Every Day Smoker     Packs/day: 1.00     Years: 30.00     Pack years: 30.00     Types: Cigarettes   • Smokeless tobacco: Never Used   Substance Use Topics   • Alcohol use: No   • Drug use: No     Recent travel: no     Review of Systems:  Review of Systems   Constitutional: Negative for chills and fever.   HENT: Negative for congestion, rhinorrhea and sore throat.    Eyes: Negative for pain and visual disturbance.   Respiratory: Negative for apnea, cough, chest tightness and shortness of breath.    Cardiovascular: Negative for chest pain and palpitations.   Gastrointestinal: Negative for abdominal pain, diarrhea, nausea and vomiting.   Genitourinary: Negative for difficulty urinating and dysuria.   Musculoskeletal: Negative for joint swelling and myalgias.   Skin: Negative for color change.   Neurological: Negative for seizures and headaches.   Psychiatric/Behavioral: Negative.    All other systems reviewed and are negative.       Physical Exam:  /79   Pulse 98   Temp 98.7 °F (37.1 °C)   Resp 19   Ht 188 cm (74\")   Wt 95.2 kg (209 lb 14.1 oz)   SpO2 97%   BMI 26.95 kg/m²     Physical Exam  Vitals and nursing note reviewed.   Constitutional:       General: He is not in acute distress.     Appearance: Normal appearance. He is not toxic-appearing.   HENT:      Head: Normocephalic and atraumatic.      Jaw: There is normal jaw occlusion.   Eyes:      General: Lids are normal.      Extraocular Movements: Extraocular movements intact.      Conjunctiva/sclera: Conjunctivae normal.      Pupils: Pupils are equal, round, and reactive to light.   Cardiovascular:      Rate and Rhythm: Normal rate and regular rhythm.      Pulses: Normal pulses.      Heart sounds: Normal heart sounds.   Pulmonary:      Effort: Pulmonary effort is normal. No " respiratory distress.      Breath sounds: Normal breath sounds. No wheezing or rhonchi.   Abdominal:      General: Abdomen is flat.      Palpations: Abdomen is soft.      Tenderness: There is no abdominal tenderness. There is no guarding or rebound.   Musculoskeletal:         General: Normal range of motion.      Cervical back: Normal range of motion and neck supple.      Right lower leg: No edema.      Left lower leg: No edema.   Skin:     General: Skin is warm and dry.   Neurological:      Mental Status: He is alert and oriented to person, place, and time. Mental status is at baseline.   Psychiatric:         Mood and Affect: Mood normal.                Medications in the Emergency Department:  Medications - No data to display     Labs  Lab Results (last 24 hours)     ** No results found for the last 24 hours. **           Imaging:  No Radiology Exams Resulted Within Past 24 Hours    Procedures:  Procedures    Progress                            Medical Decision Making:  MDM  Number of Diagnoses or Management Options  Diagnosis management comments: The patient´s CBC was reviewed and shows no abnormalities of critical concern. Of note, there is no anemia requiring a blood transfusion and the platelet count is acceptable.  CMP shows a BUN of 87 and creatinine of 8.31.  The patient's potassium is 3.3.  The patient was advised to follow-up with Dr. Sahu for dialysis.       Amount and/or Complexity of Data Reviewed  Clinical lab tests: reviewed  Tests in the medicine section of CPT®: reviewed  Discussion of test results with the performing providers: yes  Discuss the patient with other providers: yes    Risk of Complications, Morbidity, and/or Mortality  Presenting problems: moderate  Management options: moderate    Patient Progress  Patient progress: stable       Final diagnoses:   Stage 5 chronic kidney disease on chronic dialysis (HCC)        Disposition:  ED Disposition     ED Disposition   Discharge    Condition    Stable    Comment   --             This medical record created using voice recognition software and may contain unintended errors.           Zhang Oropeza MD  03/16/22 8915

## 2022-03-16 VITALS
BODY MASS INDEX: 26.94 KG/M2 | TEMPERATURE: 98.7 F | RESPIRATION RATE: 19 BRPM | HEIGHT: 74 IN | HEART RATE: 98 BPM | DIASTOLIC BLOOD PRESSURE: 79 MMHG | WEIGHT: 209.88 LBS | SYSTOLIC BLOOD PRESSURE: 135 MMHG | OXYGEN SATURATION: 97 %

## (undated) DEVICE — 3M™ STERI-DRAPE™ INSTRUMENT POUCH 1018: Brand: STERI-DRAPE™

## (undated) DEVICE — GOWN,NON-REINFORCED,SIRUS,SET IN SLV,XL: Brand: MEDLINE

## (undated) DEVICE — BANDAGE,GAUZE,BULKEE II,4.5"X4.1YD,STRL: Brand: MEDLINE

## (undated) DEVICE — ADHS SKIN DERMABOND TOP ADVANCED

## (undated) DEVICE — GLV SURG BIOGEL LTX PF 7

## (undated) DEVICE — ANTIBACTERIAL UNDYED BRAIDED (POLYGLACTIN 910), SYNTHETIC ABSORBABLE SUTURE: Brand: COATED VICRYL

## (undated) DEVICE — SUT PROLN 6/0 BV1 D/A 30IN 8709H

## (undated) DEVICE — SUT SILK 3/0 TIES 18IN A184H

## (undated) DEVICE — SUT SILK 3/0 SH CR5 18IN C0135

## (undated) DEVICE — PK AV FISTL/SHNT 40